# Patient Record
Sex: MALE | Race: WHITE | NOT HISPANIC OR LATINO | URBAN - METROPOLITAN AREA
[De-identification: names, ages, dates, MRNs, and addresses within clinical notes are randomized per-mention and may not be internally consistent; named-entity substitution may affect disease eponyms.]

---

## 2017-06-11 ENCOUNTER — INPATIENT (INPATIENT)
Facility: HOSPITAL | Age: 52
LOS: 0 days | Discharge: HOME | DRG: 392 | End: 2017-06-12
Attending: INTERNAL MEDICINE | Admitting: INTERNAL MEDICINE
Payer: COMMERCIAL

## 2017-06-11 VITALS
OXYGEN SATURATION: 97 % | DIASTOLIC BLOOD PRESSURE: 83 MMHG | TEMPERATURE: 98 F | SYSTOLIC BLOOD PRESSURE: 143 MMHG | HEART RATE: 78 BPM | RESPIRATION RATE: 18 BRPM

## 2017-06-11 DIAGNOSIS — R07.9 CHEST PAIN, UNSPECIFIED: ICD-10-CM

## 2017-06-11 DIAGNOSIS — I10 ESSENTIAL (PRIMARY) HYPERTENSION: ICD-10-CM

## 2017-06-11 DIAGNOSIS — R11.2 NAUSEA WITH VOMITING, UNSPECIFIED: ICD-10-CM

## 2017-06-11 DIAGNOSIS — E66.9 OBESITY, UNSPECIFIED: ICD-10-CM

## 2017-06-11 LAB
ALBUMIN SERPL ELPH-MCNC: 4.4 G/DL — SIGNIFICANT CHANGE UP (ref 3.3–5)
ALP SERPL-CCNC: 68 U/L — SIGNIFICANT CHANGE UP (ref 40–120)
ALT FLD-CCNC: 32 U/L RC — SIGNIFICANT CHANGE UP (ref 10–45)
ANION GAP SERPL CALC-SCNC: 13 MMOL/L — SIGNIFICANT CHANGE UP (ref 5–17)
ANISOCYTOSIS BLD QL: SLIGHT — SIGNIFICANT CHANGE UP
AST SERPL-CCNC: 19 U/L — SIGNIFICANT CHANGE UP (ref 10–40)
BASOPHILS # BLD AUTO: 0.1 K/UL — SIGNIFICANT CHANGE UP (ref 0–0.2)
BILIRUB SERPL-MCNC: 0.4 MG/DL — SIGNIFICANT CHANGE UP (ref 0.2–1.2)
BUN SERPL-MCNC: 16 MG/DL — SIGNIFICANT CHANGE UP (ref 7–23)
CALCIUM SERPL-MCNC: 9.1 MG/DL — SIGNIFICANT CHANGE UP (ref 8.4–10.5)
CHLORIDE SERPL-SCNC: 108 MMOL/L — SIGNIFICANT CHANGE UP (ref 96–108)
CK MB BLD-MCNC: 2.9 % — SIGNIFICANT CHANGE UP (ref 0–3.5)
CK MB BLD-MCNC: 3 % — SIGNIFICANT CHANGE UP (ref 0–3.5)
CK MB CFR SERPL CALC: 2.7 NG/ML — SIGNIFICANT CHANGE UP (ref 0–6.7)
CK MB CFR SERPL CALC: 3 NG/ML — SIGNIFICANT CHANGE UP (ref 0–6.7)
CK SERPL-CCNC: 105 U/L — SIGNIFICANT CHANGE UP (ref 30–200)
CK SERPL-CCNC: 91 U/L — SIGNIFICANT CHANGE UP (ref 30–200)
CO2 SERPL-SCNC: 22 MMOL/L — SIGNIFICANT CHANGE UP (ref 22–31)
CREAT SERPL-MCNC: 1.09 MG/DL — SIGNIFICANT CHANGE UP (ref 0.5–1.3)
DACRYOCYTES BLD QL SMEAR: SLIGHT — SIGNIFICANT CHANGE UP
EOSINOPHIL # BLD AUTO: 0.2 K/UL — SIGNIFICANT CHANGE UP (ref 0–0.5)
EOSINOPHIL NFR BLD AUTO: 2 % — SIGNIFICANT CHANGE UP (ref 0–6)
GLUCOSE SERPL-MCNC: 107 MG/DL — HIGH (ref 70–99)
HCT VFR BLD CALC: 42.5 % — SIGNIFICANT CHANGE UP (ref 39–50)
HGB BLD-MCNC: 13.3 G/DL — SIGNIFICANT CHANGE UP (ref 13–17)
LYMPHOCYTES # BLD AUTO: 2.7 K/UL — SIGNIFICANT CHANGE UP (ref 1–3.3)
LYMPHOCYTES # BLD AUTO: 23 % — SIGNIFICANT CHANGE UP (ref 13–44)
MCHC RBC-ENTMCNC: 22.5 PG — LOW (ref 27–34)
MCHC RBC-ENTMCNC: 31.3 GM/DL — LOW (ref 32–36)
MCV RBC AUTO: 72 FL — LOW (ref 80–100)
MICROCYTES BLD QL: SIGNIFICANT CHANGE UP
MONOCYTES # BLD AUTO: 1.1 K/UL — HIGH (ref 0–0.9)
MONOCYTES NFR BLD AUTO: 8 % — SIGNIFICANT CHANGE UP (ref 2–14)
NEUTROPHILS # BLD AUTO: 6 K/UL — SIGNIFICANT CHANGE UP (ref 1.8–7.4)
NEUTROPHILS NFR BLD AUTO: 59 % — SIGNIFICANT CHANGE UP (ref 43–77)
NT-PROBNP SERPL-SCNC: 55 PG/ML — SIGNIFICANT CHANGE UP (ref 0–300)
PLAT MORPH BLD: NORMAL — SIGNIFICANT CHANGE UP
PLATELET # BLD AUTO: 265 K/UL — SIGNIFICANT CHANGE UP (ref 150–400)
POIKILOCYTOSIS BLD QL AUTO: SLIGHT — SIGNIFICANT CHANGE UP
POTASSIUM SERPL-MCNC: 3.8 MMOL/L — SIGNIFICANT CHANGE UP (ref 3.5–5.3)
POTASSIUM SERPL-SCNC: 3.8 MMOL/L — SIGNIFICANT CHANGE UP (ref 3.5–5.3)
PROT SERPL-MCNC: 7.4 G/DL — SIGNIFICANT CHANGE UP (ref 6–8.3)
RBC # BLD: 5.91 M/UL — HIGH (ref 4.2–5.8)
RBC # FLD: 13.8 % — SIGNIFICANT CHANGE UP (ref 10.3–14.5)
RBC BLD AUTO: ABNORMAL
SODIUM SERPL-SCNC: 143 MMOL/L — SIGNIFICANT CHANGE UP (ref 135–145)
TROPONIN T SERPL-MCNC: <0.01 NG/ML — SIGNIFICANT CHANGE UP (ref 0–0.06)
TROPONIN T SERPL-MCNC: <0.01 NG/ML — SIGNIFICANT CHANGE UP (ref 0–0.06)
VARIANT LYMPHS # BLD: 8 % — HIGH (ref 0–6)
WBC # BLD: 10 K/UL — SIGNIFICANT CHANGE UP (ref 3.8–10.5)
WBC # FLD AUTO: 10 K/UL — SIGNIFICANT CHANGE UP (ref 3.8–10.5)

## 2017-06-11 PROCEDURE — 99285 EMERGENCY DEPT VISIT HI MDM: CPT | Mod: 25

## 2017-06-11 PROCEDURE — 76700 US EXAM ABDOM COMPLETE: CPT | Mod: 26

## 2017-06-11 PROCEDURE — 71010: CPT | Mod: 26

## 2017-06-11 PROCEDURE — 93010 ELECTROCARDIOGRAM REPORT: CPT

## 2017-06-11 RX ORDER — PANTOPRAZOLE SODIUM 20 MG/1
40 TABLET, DELAYED RELEASE ORAL
Qty: 0 | Refills: 0 | Status: DISCONTINUED | OUTPATIENT
Start: 2017-06-11 | End: 2017-06-12

## 2017-06-11 RX ORDER — HEPARIN SODIUM 5000 [USP'U]/ML
5000 INJECTION INTRAVENOUS; SUBCUTANEOUS EVERY 12 HOURS
Qty: 0 | Refills: 0 | Status: DISCONTINUED | OUTPATIENT
Start: 2017-06-11 | End: 2017-06-12

## 2017-06-11 RX ORDER — ASPIRIN/CALCIUM CARB/MAGNESIUM 324 MG
162 TABLET ORAL DAILY
Qty: 0 | Refills: 0 | Status: DISCONTINUED | OUTPATIENT
Start: 2017-06-11 | End: 2017-06-12

## 2017-06-11 RX ORDER — FAMOTIDINE 10 MG/ML
20 INJECTION INTRAVENOUS ONCE
Qty: 0 | Refills: 0 | Status: COMPLETED | OUTPATIENT
Start: 2017-06-11 | End: 2017-06-11

## 2017-06-11 RX ORDER — LOSARTAN POTASSIUM 100 MG/1
1 TABLET, FILM COATED ORAL
Qty: 0 | Refills: 0 | COMMUNITY

## 2017-06-11 RX ORDER — LOSARTAN POTASSIUM 100 MG/1
100 TABLET, FILM COATED ORAL DAILY
Qty: 0 | Refills: 0 | Status: DISCONTINUED | OUTPATIENT
Start: 2017-06-11 | End: 2017-06-12

## 2017-06-11 RX ORDER — METOPROLOL TARTRATE 50 MG
25 TABLET ORAL
Qty: 0 | Refills: 0 | Status: DISCONTINUED | OUTPATIENT
Start: 2017-06-11 | End: 2017-06-12

## 2017-06-11 RX ADMIN — Medication 162 MILLIGRAM(S): at 17:17

## 2017-06-11 RX ADMIN — FAMOTIDINE 20 MILLIGRAM(S): 10 INJECTION INTRAVENOUS at 18:45

## 2017-06-11 NOTE — ED ADULT NURSE REASSESSMENT NOTE - NS ED NURSE REASSESS COMMENT FT1
pt resting comfortably on continuous portable tele monitor. wife at bedside. denies current cp, sob, numbness, tingling, weakness or dizziness. pt tba, pending bed assignment. safety precautions maintained.

## 2017-06-11 NOTE — ED PROVIDER NOTE - OBJECTIVE STATEMENT
51 y/o male w/ htn with 3 days of epgiastric burning pain radiating to chest, associated wityh 1 episode vomiting. first episode lasted 1 hour 3 days ago, second episode for may hours last night.  Both episodes after exerting himself more than normal. currently pain free.  +FH, nonsmoker, +obesity.

## 2017-06-11 NOTE — H&P ADULT - NSHPLABSRESULTS_GEN_ALL_CORE
13.3   10.0  )-----------( 265      ( 11 Jun 2017 17:19 )             42.5       06-11    143  |  108  |  16  ----------------------------<  107<H>  3.8   |  22  |  1.09    Ca    9.1      11 Jun 2017 17:19    TPro  7.4  /  Alb  4.4  /  TBili  0.4  /  DBili  x   /  AST  19  /  ALT  32  /  AlkPhos  68  06-11                      Lactate Trend      CARDIAC MARKERS ( 11 Jun 2017 17:19 )  x     / <0.01 ng/mL / 105 U/L / x     / 3.0 ng/mL        CAPILLARY BLOOD GLUCOSE 13.3   10.0  )-----------( 265      ( 11 Jun 2017 17:19 )             42.5       06-11    143  |  108  |  16  ----------------------------<  107<H>  3.8   |  22  |  1.09    Ca    9.1      11 Jun 2017 17:19    TPro  7.4  /  Alb  4.4  /  TBili  0.4  /  DBili  x   /  AST  19  /  ALT  32  /  AlkPhos  68  06-11        EKG SR NSST/T              Lactate Trend      CARDIAC MARKERS ( 11 Jun 2017 17:19 )  x     / <0.01 ng/mL / 105 U/L / x     / 3.0 ng/mL        CAPILLARY BLOOD GLUCOSE

## 2017-06-11 NOTE — ED ADULT NURSE NOTE - OBJECTIVE STATEMENT
51yo m a&ox4 ambulated into ED c/o chest pain mid sternal since friday intermittent. pt states she took nexium with no relief, and yesterday also had vomiting with chest pain. pt was sent in by pmd for cath. pt denies n/v currently, no diaphoresis, denies h/o chest pain prior to this week. pt denies sob, lungs clear b/l, skin w/d/i, abd soft nontender nondistended.

## 2017-06-11 NOTE — H&P ADULT - NSHPREVIEWOFSYSTEMS_GEN_ALL_CORE
REVIEW OF SYSTEMS:    CONSTITUTIONAL: No weakness, fevers or chills  EYES/ENT: No visual changes;  No vertigo or throat pain   NECK: No pain or stiffness  RESPIRATORY: No cough, wheezing, hemoptysis; No shortness of breath  CARDIOVASCULAR: chest pain no palpitations  GASTROINTESTINAL:  epigastric pain, nausea, vomiting. No hematemesis; No diarrhea or constipation. No melena or hematochezia.  GENITOURINARY: No dysuria, frequency or hematuria  NEUROLOGICAL: No numbness or weakness  SKIN: No itching, burning, rashes, or lesions   All other review of systems is negative unless indicated above.

## 2017-06-11 NOTE — ED PROVIDER NOTE - ATTENDING CONTRIBUTION TO CARE
MD Fox:  patient seen and evaluated with the resident.  I was present for key portions of the History & Physical, and I agree with the Impression & Plan.  MD Fox:  53 yo M c/o epigastric pain for the last 2d.  Onset Friday 4am, worse Sat night after fatty meal with associated diaphoresis, but no SOB.  +FHx CAD, +obesity.  Told to go to ED by primary MD, Dr. TEREZA Barros.  NO exercise intolerance.  VS - wnl.  Physical Exam: adult M, NAD, NCAT, PERRL, EOMI, neck supple, CTA B, RRR, Abd:  s/nd + RUQ/epigastric TTP.  no peripheral edema.  ECG - wnl.  impression:  ACS vs biliary colic.  Plan:  acs w/u with madeleine workup in parallel with RUQ US. MD Fox:  patient seen and evaluated with the resident.  I was present for key portions of the History & Physical, and I agree with the Impression & Plan.  MD Fox:  53 yo M c/o epigastric pain for the last 2d.  Onset Friday 4am, worse Sat night after fatty meal with associated diaphoresis, but no SOB.  +FHx CAD, +obesity.  Told to go to ED by primary MD, Dr. TEREZA Barros.  Reports symptoms did get worse yesterday with exercise.  VS - wnl.  Physical Exam: adult M, NAD, NCAT, PERRL, EOMI, neck supple, CTA B, RRR, Abd:  s/nd + RUQ/epigastric TTP.  no peripheral edema.  ECG - wnl.  impression:  ACS vs biliary colic.  Plan:  acs w/u with madeleine workup in parallel with RUQ US.  Likely admit for cath tomorrow.

## 2017-06-11 NOTE — ED PROVIDER NOTE - PROGRESS NOTE DETAILS
Carlos Rodas: [phone]. Carlos Rodas: [phone].  Spoke with dr neves, would not like heparin at this time

## 2017-06-11 NOTE — ED PROVIDER NOTE - MEDICAL DECISION MAKING DETAILS
MD Fox:  51 yo M c/o epigastric pain for the last 2d.  Onset Friday 4am, worse Sat night after fatty meal with associated diaphoresis, but no SOB.  +FHx CAD, +obesity.  Told to go to ED by primary MD, Dr. TEREZA Barros.  NO exercise intolerance.  VS - wnl.  Physical Exam: adult M, NAD, NCAT, PERRL, EOMI, neck supple, CTA B, RRR, Abd:  s/nd + RUQ/epigastric TTP.  no peripheral edema.  ECG - wnl.  impression:  ACS vs biliary colic.  Plan:  acs w/u with madeleine workup in parallel with RUQ US.

## 2017-06-11 NOTE — H&P ADULT - HISTORY OF PRESENT ILLNESS
53 y/o male w/ htn with 3 days of epgiastric burning pain radiating to chest, associated wityh 1 episode vomiting. first episode lasted 1 hour 3 days ago, second episode for may hours last night.  Both episodes after exerting himself more than normal. currently pain free.  +FH, nonsmoker, +obesity. 53 y/o male w/ htn with 3 days of epigastric burning pain radiating to chest, associated wityh 1 episode vomiting. first episode lasted 1 hour 3 days ago, second episode for may hours last night.  Both episodes after exerting himself more than normal. currently pain free.  +FH, nonsmoker, +obesity.

## 2017-06-12 VITALS
HEART RATE: 68 BPM | RESPIRATION RATE: 16 BRPM | DIASTOLIC BLOOD PRESSURE: 97 MMHG | OXYGEN SATURATION: 99 % | SYSTOLIC BLOOD PRESSURE: 139 MMHG

## 2017-06-12 DIAGNOSIS — Z82.49 FAMILY HISTORY OF ISCHEMIC HEART DISEASE AND OTHER DISEASES OF THE CIRCULATORY SYSTEM: ICD-10-CM

## 2017-06-12 DIAGNOSIS — I10 ESSENTIAL (PRIMARY) HYPERTENSION: ICD-10-CM

## 2017-06-12 DIAGNOSIS — R07.9 CHEST PAIN, UNSPECIFIED: ICD-10-CM

## 2017-06-12 LAB
AMYLASE P1 CFR SERPL: 38 U/L — SIGNIFICANT CHANGE UP (ref 25–125)
ANION GAP SERPL CALC-SCNC: 12 MMOL/L — SIGNIFICANT CHANGE UP (ref 5–17)
BUN SERPL-MCNC: 14 MG/DL — SIGNIFICANT CHANGE UP (ref 7–23)
CALCIUM SERPL-MCNC: 8.9 MG/DL — SIGNIFICANT CHANGE UP (ref 8.4–10.5)
CHLORIDE SERPL-SCNC: 107 MMOL/L — SIGNIFICANT CHANGE UP (ref 96–108)
CO2 SERPL-SCNC: 22 MMOL/L — SIGNIFICANT CHANGE UP (ref 22–31)
CREAT SERPL-MCNC: 1.05 MG/DL — SIGNIFICANT CHANGE UP (ref 0.5–1.3)
GLUCOSE SERPL-MCNC: 108 MG/DL — HIGH (ref 70–99)
HCT VFR BLD CALC: 40.8 % — SIGNIFICANT CHANGE UP (ref 39–50)
HGB BLD-MCNC: 13.1 G/DL — SIGNIFICANT CHANGE UP (ref 13–17)
LIDOCAIN IGE QN: 25 U/L — SIGNIFICANT CHANGE UP (ref 7–60)
MCHC RBC-ENTMCNC: 22.7 PG — LOW (ref 27–34)
MCHC RBC-ENTMCNC: 32.1 GM/DL — SIGNIFICANT CHANGE UP (ref 32–36)
MCV RBC AUTO: 70.6 FL — LOW (ref 80–100)
PLATELET # BLD AUTO: 251 K/UL — SIGNIFICANT CHANGE UP (ref 150–400)
POTASSIUM SERPL-MCNC: 4.1 MMOL/L — SIGNIFICANT CHANGE UP (ref 3.5–5.3)
POTASSIUM SERPL-SCNC: 4.1 MMOL/L — SIGNIFICANT CHANGE UP (ref 3.5–5.3)
RBC # BLD: 5.78 M/UL — SIGNIFICANT CHANGE UP (ref 4.2–5.8)
RBC # FLD: 15.7 % — HIGH (ref 10.3–14.5)
SODIUM SERPL-SCNC: 141 MMOL/L — SIGNIFICANT CHANGE UP (ref 135–145)
TROPONIN T SERPL-MCNC: <0.01 NG/ML — SIGNIFICANT CHANGE UP (ref 0–0.06)
WBC # BLD: 7.45 K/UL — SIGNIFICANT CHANGE UP (ref 3.8–10.5)
WBC # FLD AUTO: 7.45 K/UL — SIGNIFICANT CHANGE UP (ref 3.8–10.5)

## 2017-06-12 PROCEDURE — 85027 COMPLETE CBC AUTOMATED: CPT

## 2017-06-12 PROCEDURE — 83880 ASSAY OF NATRIURETIC PEPTIDE: CPT

## 2017-06-12 PROCEDURE — C1894: CPT

## 2017-06-12 PROCEDURE — C1887: CPT

## 2017-06-12 PROCEDURE — 93458 L HRT ARTERY/VENTRICLE ANGIO: CPT

## 2017-06-12 PROCEDURE — 71045 X-RAY EXAM CHEST 1 VIEW: CPT

## 2017-06-12 PROCEDURE — 76700 US EXAM ABDOM COMPLETE: CPT

## 2017-06-12 PROCEDURE — C1769: CPT

## 2017-06-12 PROCEDURE — 82150 ASSAY OF AMYLASE: CPT

## 2017-06-12 PROCEDURE — 80048 BASIC METABOLIC PNL TOTAL CA: CPT

## 2017-06-12 PROCEDURE — 96374 THER/PROPH/DIAG INJ IV PUSH: CPT

## 2017-06-12 PROCEDURE — 82550 ASSAY OF CK (CPK): CPT

## 2017-06-12 PROCEDURE — 82553 CREATINE MB FRACTION: CPT

## 2017-06-12 PROCEDURE — 84484 ASSAY OF TROPONIN QUANT: CPT

## 2017-06-12 PROCEDURE — 99285 EMERGENCY DEPT VISIT HI MDM: CPT | Mod: 25

## 2017-06-12 PROCEDURE — 93005 ELECTROCARDIOGRAM TRACING: CPT

## 2017-06-12 PROCEDURE — 83690 ASSAY OF LIPASE: CPT

## 2017-06-12 PROCEDURE — 80053 COMPREHEN METABOLIC PANEL: CPT

## 2017-06-12 RX ORDER — PANTOPRAZOLE SODIUM 20 MG/1
1 TABLET, DELAYED RELEASE ORAL
Qty: 30 | Refills: 1 | OUTPATIENT
Start: 2017-06-12 | End: 2017-08-10

## 2017-06-12 RX ADMIN — Medication 162 MILLIGRAM(S): at 10:37

## 2017-06-12 RX ADMIN — LOSARTAN POTASSIUM 100 MILLIGRAM(S): 100 TABLET, FILM COATED ORAL at 06:10

## 2017-06-12 RX ADMIN — PANTOPRAZOLE SODIUM 40 MILLIGRAM(S): 20 TABLET, DELAYED RELEASE ORAL at 06:10

## 2017-06-12 NOTE — PROVIDER CONTACT NOTE (OTHER) - ACTION/TREATMENT ORDERED:
as per NP Solis bernstein, will discuss with cardiology the reason for lopressor, document patient's refusal at this time
Educated on the risks and benefits of DVT prophylaxis, pt continues to refuse, as per NP Solis, document patient's refusal at this time

## 2017-06-12 NOTE — CONSULT NOTE ADULT - ASSESSMENT
52 year old male with 3 day history of substernal burning pain with associated nausea, slight relief with TUMS.  Now s/p diagnostic cath - await official report (pending)    Suspect dyspepsia/ GERD  trial of Protonix  can use Maalox/TUMS prn  Tolerating PO diet  Outpatient follow up with Dr Chavez - may need EGD as outpatient if symptoms still persist after 2-3 weeks [phone]    No GI objection to discharge planning    Thank you for the courtesy of this consult.    Shaheen Nick PA-C    [facility]  [phone] 52 year old male with 3 day history of substernal burning pain with associated nausea, slight relief with TUMS.  Now s/p diagnostic cath - await official report (pending). US negative for acute pathology except for fatty liver.    Suspect dyspepsia/ GERD  trial of Protonix  can use Maalox/TUMS prn  Tolerating PO diet  Outpatient follow up with Dr Chavez - may need EGD as outpatient if symptoms still persist after 2-3 weeks [phone]    No GI objection to discharge planning    Thank you for the courtesy of this consult.    Shaheen Nick PA-C    [facility]  [phone]

## 2017-06-12 NOTE — CONSULT NOTE ADULT - SUBJECTIVE AND OBJECTIVE BOX
Patient is a 52y old  Male who presents with a chief complaint of Chest pain on exertion (12 Jun 2017 11:31)      HPI:  53 y/o male w/ htn with 3 days of epigastric burning pain radiating to chest, associated with 1 episode vomiting . First episode lasted 1 hour Thursday, second episode for many hours Saturday night.  Both episodes after exerting himself more than normal. Currently pain free.  +FH, nonsmoker, +obesity. (11 Jun 2017 21:08)    No further chest pain, but +dyspepsia - improved with PO Tums  No previous EGD   no BRBRPR, diarrhea, melena, abdominal pain, hemetemesis.  Appetite good and tolerating PO diet  s/p diagnostic cath this morning - no intervention. He is eager to go home today.      PAST MEDICAL & SURGICAL HISTORY:  Hypertension  s/p lap appy  colon polyps (benign)      Allergies    No Known Allergies        MEDICATIONS  (STANDING):  aspirin  chewable 162milliGRAM(s) Oral daily  heparin  Injectable 5000Unit(s) SubCutaneous every 12 hours  pantoprazole    Tablet 40milliGRAM(s) Oral before breakfast  metoprolol 25milliGRAM(s) Oral two times a day  losartan 100milliGRAM(s) Oral daily    MEDICATIONS  (PRN):      Social History:    Marital Status:  (   )    (   ) Single    (   )    (  )   Occupation:   Lives with: (  ) alone  (  ) children   (  ) spouse   (  ) parents  (  ) other    Substance Use (street drugs): (  ) never used  (  ) other:  Tobacco Usage:  (   ) never smoked   (   ) former smoker   (   ) current smoker  (     ) pack year  (        ) last cigarette date  Alcohol Usage:  Sexual History:     Family History   IBD (  ) Yes   (  x) No  GI Malignancy ( x )  Yes - mother colon ca dx age 70's   (  ) No    Health Management     Last Colonoscopy - last colo approx. 2 years ago, small benign polyps     Advanced Directives: (  x   ) None    (      ) DNR    (     ) DNI    (     ) Health Care Proxy:     Review of Systems:    General:  No wt loss, fevers, chills, night sweats, fatigue  CV:  +substernal pain - resolved. No palpitations, hypo/hypertension  Resp:  No dyspnea, cough, tachypnea, wheezing  GI:  see HPI  :  No pain, bleeding, incontinence, nocturia  Muscle:  No pain, weakness  Neuro:  No focal weakness, tingling, memory problems  Psych:  No fatigue, insomnia, mood problems, depression  Endocrine:  No polyuria, polydypsia, cold/heat intolerance  Heme:  No petechiae, ecchymosis, easy bruisability  Skin:  No rash, tattoos, scars, edema      Vital Signs Last 24 Hrs  T(C): 36.4, Max: 36.8 (06-11 @ 16:29)  T(F): 97.5, Max: 98.3 (06-11 @ 16:29)  HR: 72 (60 - 78)  BP: 137/96 (132/89 - 162/98)  BP(mean): --  RR: 17 (16 - 19)  SpO2: 98% (96% - 98%)    PHYSICAL EXAM:    Constitutional: NAD, well-developed, non-toxic. completed 100% of breakfast tray  Neck: No LAD, supple  Respiratory: clear anteriorly, no rales, rhonchi, wheezes. Non-tender to palpation  Cardiovascular: S1 and S2, RRR  Gastrointestinal: BS+, soft, NT/ND, neg HSM  Extremities: No peripheral edema, neg clubbing, cyanosis  Vascular: 2+ peripheral pulses  Neurological: A/O x 3, no focal deficits  Psychiatric: Normal mood, normal affect  Skin: No rashes        LABS:                        13.1   7.45  )-----------( 251      ( 12 Jun 2017 07:19 )             40.8     06-12    141  |  107  |  14  ----------------------------<  108<H>  4.1   |  22  |  1.05    Ca    8.9      12 Jun 2017 06:11    TPro  7.4  /  Alb  4.4  /  TBili  0.4  /  DBili  x   /  AST  19  /  ALT  32  /  AlkPhos  68  06-11        LIVER FUNCTIONS - ( 11 Jun 2017 17:19 )  Alb: 4.4 g/dL / Pro: 7.4 g/dL / ALK PHOS: 68 U/L / ALT: 32 U/L RC / AST: 19 U/L / GGT: x             RADIOLOGY & ADDITIONAL TESTS: Patient is a 52y old  Male who presents with a chief complaint of Chest pain on exertion (12 Jun 2017 11:31)      HPI:  51 y/o male w/ htn with 3 days of epigastric burning pain radiating to chest, associated with 1 episode vomiting . First episode lasted 1 hour Thursday, second episode for many hours Saturday night.  Both episodes after exerting himself more than normal. Currently pain free.  +FH, nonsmoker, +obesity. (11 Jun 2017 21:08)    No further chest pain, but +dyspepsia - improved with PO Tums  No previous EGD   no BRBRPR, diarrhea, melena, abdominal pain, hemetemesis.  Appetite good and tolerating PO diet  s/p diagnostic cath this morning - no intervention. He is eager to go home today.      PAST MEDICAL & SURGICAL HISTORY:  Hypertension  s/p lap appy  colon polyps (benign)      Allergies    No Known Allergies        MEDICATIONS  (STANDING):  aspirin  chewable 162milliGRAM(s) Oral daily  heparin  Injectable 5000Unit(s) SubCutaneous every 12 hours  pantoprazole    Tablet 40milliGRAM(s) Oral before breakfast  metoprolol 25milliGRAM(s) Oral two times a day  losartan 100milliGRAM(s) Oral daily    MEDICATIONS  (PRN):      Social History:    Marital Status:  (   )    (   ) Single    (   )    (  )   Occupation:   Lives with: (  ) alone  (  ) children   (  ) spouse   (  ) parents  (  ) other    Substance Use (street drugs): (  ) never used  (  ) other:  Tobacco Usage:  (   ) never smoked   (   ) former smoker   (   ) current smoker  (     ) pack year  (        ) last cigarette date  Alcohol Usage:  Sexual History:     Family History   IBD (  ) Yes   (  x) No  GI Malignancy ( x )  Yes - mother colon ca dx age 70's   (  ) No    Health Management     Last Colonoscopy - last colo approx. 2 years ago, small benign polyps     Advanced Directives: (  x   ) None    (      ) DNR    (     ) DNI    (     ) Health Care Proxy:     Review of Systems:    General:  No wt loss, fevers, chills, night sweats, fatigue  CV:  +substernal pain - resolved. No palpitations, hypo/hypertension  Resp:  No dyspnea, cough, tachypnea, wheezing  GI:  see HPI  :  No pain, bleeding, incontinence, nocturia  Muscle:  No pain, weakness  Neuro:  No focal weakness, tingling, memory problems  Psych:  No fatigue, insomnia, mood problems, depression  Endocrine:  No polyuria, polydypsia, cold/heat intolerance  Heme:  No petechiae, ecchymosis, easy bruisability  Skin:  No rash, tattoos, scars, edema      Vital Signs Last 24 Hrs  T(C): 36.4, Max: 36.8 (06-11 @ 16:29)  T(F): 97.5, Max: 98.3 (06-11 @ 16:29)  HR: 72 (60 - 78)  BP: 137/96 (132/89 - 162/98)  BP(mean): --  RR: 17 (16 - 19)  SpO2: 98% (96% - 98%)    PHYSICAL EXAM:    Constitutional: NAD, well-developed, non-toxic. completed 100% of breakfast tray  Neck: No LAD, supple  Respiratory: clear anteriorly, no rales, rhonchi, wheezes. Non-tender to palpation  Cardiovascular: S1 and S2, RRR  Gastrointestinal: BS+, soft, NT/ND, neg HSM  Extremities: No peripheral edema, neg clubbing, cyanosis  Vascular: 2+ peripheral pulses  Neurological: A/O x 3, no focal deficits  Psychiatric: Normal mood, normal affect  Skin: No rashes        LABS:                        13.1   7.45  )-----------( 251      ( 12 Jun 2017 07:19 )             40.8     06-12    141  |  107  |  14  ----------------------------<  108<H>  4.1   |  22  |  1.05    Ca    8.9      12 Jun 2017 06:11    TPro  7.4  /  Alb  4.4  /  TBili  0.4  /  DBili  x   /  AST  19  /  ALT  32  /  AlkPhos  68  06-11        LIVER FUNCTIONS - ( 11 Jun 2017 17:19 )  Alb: 4.4 g/dL / Pro: 7.4 g/dL / ALK PHOS: 68 U/L / ALT: 32 U/L RC / AST: 19 U/L / GGT: x             RADIOLOGY & ADDITIONAL TESTS:ECHNIQUE: Sonography of the abdomen.     FINDINGS:    Liver: Measures 19.4 cm. Increased liver echogenicity likely reflects   steatosis.    Bile ducts: Normal caliber. Common bile duct measures 4 mm.     Gallbladder: No evidence of gallbladder wall thickening, gallstones, or   pericholecystic fluid. Negative sonographic Zapata sign.    Pancreas: Visualized portions are within normal limits.    Spleen: Measures 12.5 cm. Within normal limits.    Right kidney: Measures 11.2 cm. No hydronephrosis.    Left kidney: Measures 10.9 cm. No hydronephrosis.     Ascites: None.    Aorta and IVC: Visualized portions are within normal limits.    IMPRESSION:     No evidence of acute cholecystitis.  Hepatic steatosis.

## 2017-06-12 NOTE — DISCHARGE NOTE ADULT - HOSPITAL COURSE
53 y/o male w/ htn with 3 days of epigastric burning pain radiating to chest, associated with 1 episode vomiting, first episode lasted 1 hour 3 days ago, second episode for may hours last night. Both episodes after exerting himself more than normal, currently pain free.  +FH, nonsmoker, +obesity. S/p Cath: normal coronaries, VSS, right radial site benign. GI consult advised out pt f/u with Novogrudsky for EGD. Pt disagreed home with Protonix. 53 y/o male w/ htn with 3 days of epigastric burning pain radiating to chest, associated with 1 episode vomiting, first episode lasted 1 hour 3 days ago, second episode for may hours last night. Both episodes after exerting himself more than normal, currently pain free.  +FH, nonsmoker, +obesity. S/p Cath: normal coronaries, VSS, right radial site benign. GI consult advised out pt f/u with Novogrudsky for EGD. Pt discharged home with Protonix.

## 2017-06-12 NOTE — PROGRESS NOTE ADULT - PROBLEM SELECTOR PLAN 1
cardiac monitoring  cardiac enzymes  cardiology evaluation Dr. Marie noted  Cath with normal coronaries.

## 2017-06-12 NOTE — CONSULT NOTE ADULT - ATTENDING COMMENTS
Agree with above.  Pt with likely atypical chest pain.  ?GERD.  Need to r/o PUD.  No GI objection to d/c with outpt evaluation.  Contact information given to patient.

## 2017-06-12 NOTE — PROVIDER CONTACT NOTE (OTHER) - SITUATION
Heparin Sub Q ordered for DVT prophylaxis, pt refusing states that he walks and does not need heparin

## 2017-06-12 NOTE — DISCHARGE NOTE ADULT - MEDICATION SUMMARY - MEDICATIONS TO TAKE
I will START or STAY ON the medications listed below when I get home from the hospital:    losartan 100 mg oral tablet  -- 1 tab(s) by mouth once a day  -- Indication: For Chest pain    pantoprazole 40 mg oral delayed release tablet  -- 1 tab(s) by mouth once a day (before a meal)  -- Indication: For acid reflux I will START or STAY ON the medications listed below when I get home from the hospital:    losartan 100 mg oral tablet  -- 1 tab(s) by mouth once a day  -- Indication: For Chest pain    pantoprazole 40 mg oral delayed release tablet  -- 1 tab(s) by mouth once a day (before a meal)  -- Indication: For acide reflux

## 2017-06-12 NOTE — DISCHARGE NOTE ADULT - CARE PROVIDERS DIRECT ADDRESSES
,DirectAddress_Unknown,[email or web address],DirectAddress_Unknown

## 2017-06-12 NOTE — PROGRESS NOTE ADULT - PROBLEM SELECTOR PLAN 3
nausea control  gastroenterology evaluation noted  probable gastritis   follow up with GI as otp for EGD

## 2017-06-12 NOTE — DISCHARGE NOTE ADULT - PLAN OF CARE
free of chest pain Low salt, low fat diet.   Weight management.   Take medications as prescribed.    No smoking.  Follow up appointments with your doctor(s)  as instructed. Your blood pressure will be controlled. Continue with your blood pressure medications; eat a heart healthy diet with low salt diet; exercise regularly (consult with your physician or cardiologist first); maintain a heart healthy weight; if you smoke - quit (A resource to help you stop smoking is the [facility] – phone number [phone].); include healthy ways to manage stress. Continue to follow with your primary care physician or cardiologist.

## 2017-06-12 NOTE — CONSULT NOTE ADULT - SUBJECTIVE AND OBJECTIVE BOX
CHIEF COMPLAINT:chest burning     HISTORY OF PRESENT ILLNESS:  HPI:  51 y/o male w/ htn with 3 days of epigastric burning pain radiating to chest, associated wityh 1 episode vomiting. first episode lasted 1 hour 3 days ago, second episode for may hours last night.  Both episodes after exerting himself more than normal. currently pain free.  +FH, nonsmoker, +obesity.   negative enzymes, normal LFTs and normal ultrasound of abdomen    PAST MEDICAL & SURGICAL HISTORY:  Hypertension          MEDICATIONS:  aspirin  chewable 162milliGRAM(s) Oral daily  heparin  Injectable 5000Unit(s) SubCutaneous every 12 hours  metoprolol 25milliGRAM(s) Oral two times a day  losartan 100milliGRAM(s) Oral del      pantoprazole    Tablet 40milliGRAM(s) Oral before breakfast          FAMILY HISTORY:      SOCIAL HISTORY:    [ ] Non-smoker  [ ] Smoker  [ ] Alcohol    Allergies    No Known Allergies    Intolerances    	    REVIEW OF SYSTEMS:  CONSTITUTIONAL: No fever, weight loss, or fatigue  EYES: No eye pain, visual disturbances, or discharge  ENMT:  No difficulty hearing, tinnitus, vertigo; No sinus or throat pain  NECK: No pain or stiffness  RESPIRATORY: No cough, wheezing, chills or hemoptysis; No Shortness of Breath  CARDIOVASCULAR: No chest pain, palpitations, passing out, dizziness, or leg swelling  GASTROINTESTINAL: No abdominal or epigastric pain. No nausea, vomiting, or hematemesis; No diarrhea or constipation. No melena or hematochezia.  GENITOURINARY: No dysuria, frequency, hematuria, or incontinence  NEUROLOGICAL: No headaches, memory loss, loss of strength, numbness, or tremors  SKIN: No itching, burning, rashes, or lesions   LYMPH Nodes: No enlarged glands  ENDOCRINE: No heat or cold intolerance; No hair loss  MUSCULOSKELETAL: No joint pain or swelling; No muscle, back, or extremity pain  PSYCHIATRIC: No depression, anxiety, mood swings, or difficulty sleeping  HEME/LYMPH: No easy bruising, or bleeding gums  ALLERY AND IMMUNOLOGIC: No hives or eczema	    PHYSICAL EXAM:  T(C): 36.4, Max: 36.8 (06-11 @ 16:29)  HR: 60 (60 - 78)  BP: 147/97 (132/89 - 162/98)  RR: 17 (17 - 19)  SpO2: 96% (96% - 98%)  Wt(kg): --  I&O's Summary      Appearance: Normal	  HEENT:   Normal oral mucosa, PERRL, EOMI	  Lymphatic: No lymphadenopathy  Cardiovascular: Normal S1 S2, No JVD, No murmurs, No edema  Respiratory: Lungs clear to auscultation	  Psychiatry: A & O x 3, Mood & affect appropriate  Gastrointestinal:  Soft, Non-tender, + BS	  Skin: No rashes, No ecchymoses, No cyanosis	  Neurologic: Non-focal  : Normal range of motion, No clubbing, cyanosis or edema  Vascular: Peripheral pulses palpable 2+ bilaterally    TELEMETRY: 	    ECG:NSR WNL 6/11/17  	  : 	  	  LABS:	 	    CARDIAC MARKERS:  Troponin T, Serum: <0.01 ng/mL (06-12 @ 06:11)  Troponin T, Serum: <0.01 ng/mL (06-11 @ 21:35)  Troponin T, Serum: <0.01 ng/mL (06-11 @ 17:19)      ultrasound abdomen: 6/11/17: allbladder: No evidence of gallbladder wall thickening, gallstones, or   pericholecystic fluid. Negative sonographic Zapata sign.    Pancreas: Visualized portions are within normal limits.    Spleen: Measures 12.5 cm. Within normal limits.    Right kidney: Measures 11.2 cm. No hydronephrosis.    Left kidney: Measures 10.9 cm. No hydronephrosis.     Ascites: None.    Aorta and IVC: Visualized portions are within normal limits.    IMPRESSION:     No evidence of acute cholecystitis.  Hepatic steatosis.                              13.1   7.45  )-----------( 251      ( 12 Jun 2017 07:19 )             40.8     06-12    141  |  107  |  14  ----------------------------<  108<H>  4.1   |  22  |  1.05    Ca    8.9      12 Jun 2017 06:11    TPro  7.4  /  Alb  4.4  /  TBili  0.4  /  DBili  x   /  AST  19  /  ALT  32  /  AlkPhos  68  06-11    proBNP: Serum Pro-Brain Natriuretic Peptide: 55 pg/mL (06-11 @ 17:19)

## 2017-06-12 NOTE — CONSULT NOTE ADULT - ASSESSMENT
Impression:  1. chest burning with risk factors for CAD  2. rule out CAD    Recommend:  1. diagnostic cath  2. if negative discharge home  3. risk benefits and alternatives discussed.

## 2017-06-12 NOTE — DISCHARGE NOTE ADULT - CARE PLAN
Principal Discharge DX:	Chest pain  Goal:	free of chest pain  Instructions for follow-up, activity and diet:	Low salt, low fat diet.   Weight management.   Take medications as prescribed.    No smoking.  Follow up appointments with your doctor(s)  as instructed.  Secondary Diagnosis:	Hypertension  Goal:	Your blood pressure will be controlled.  Instructions for follow-up, activity and diet:	Continue with your blood pressure medications; eat a heart healthy diet with low salt diet; exercise regularly (consult with your physician or cardiologist first); maintain a heart healthy weight; if you smoke - quit (A resource to help you stop smoking is the [facility] – phone number [phone].); include healthy ways to manage stress. Continue to follow with your primary care physician or cardiologist. Principal Discharge DX:	Chest pain  Goal:	free of chest pain  Instructions for follow-up, activity and diet:	Low salt, low fat diet.   Weight management.   Take medications as prescribed.    No smoking.  Follow up appointments with your doctor(s)  as instructed.  Secondary Diagnosis:	Hypertension  Goal:	Your blood pressure will be controlled.  Instructions for follow-up, activity and diet:	Continue with your blood pressure medications; eat a heart healthy diet with low salt diet; exercise regularly (consult with your physician or cardiologist first); maintain a heart healthy weight; if you smoke - quit (A resource to help you stop smoking is the [facility] – phone number [phone].); include healthy ways to manage stress. Continue to follow with your primary care physician or cardiologist. Principal Discharge DX:	Chest pain  Goal:	free of chest pain  Instructions for follow-up, activity and diet:	Low salt, low fat diet.   Weight management.   Take medications as prescribed.    No smoking.  Follow up appointments with your doctor(s)  as instructed.  Secondary Diagnosis:	Hypertension  Goal:	Your blood pressure will be controlled.  Instructions for follow-up, activity and diet:	Continue with your blood pressure medications; eat a heart healthy diet with low salt diet; exercise regularly (consult with your physician or cardiologist first); maintain a heart healthy weight; if you smoke - quit (A resource to help you stop smoking is the [facility] – phone number [phone].); include healthy ways to manage stress. Continue to follow with your primary care physician or cardiologist.

## 2017-06-12 NOTE — PROGRESS NOTE ADULT - SUBJECTIVE AND OBJECTIVE BOX
Patient is a 52y old  Male who presents with a chief complaint of Chest pain on exertion (12 Jun 2017 11:31)      SUBJECTIVE / OVERNIGHT EVENTS: Comfortable  Review of Systems  chest pain no  palpitations no  sob no  nausea no  headache no    MEDICATIONS  (STANDING):  aspirin  chewable 162milliGRAM(s) Oral daily  heparin  Injectable 5000Unit(s) SubCutaneous every 12 hours  pantoprazole    Tablet 40milliGRAM(s) Oral before breakfast  metoprolol 25milliGRAM(s) Oral two times a day  losartan 100milliGRAM(s) Oral daily    MEDICATIONS  (PRN):      Vital Signs Last 24 Hrs  T(C): 36.4, Max: 36.8 (06-11 @ 16:29)  HR: 67 (60 - 78)  BP: 137/89 (132/89 - 162/98)  RR: 17 (16 - 19)  SpO2: 99% (96% - 99%)  Wt(kg): --  CAPILLARY BLOOD GLUCOSE    I&O's Summary    I & Os for current day (as of 12 Jun 2017 15:57)  =============================================  IN: 320 ml / OUT: 0 ml / NET: 320 ml      PHYSICAL EXAM:  GENERAL: NAD, well-developed  HEAD:  Atraumatic, Normocephalic  EYES: EOMI, PERRLA, conjunctiva and sclera clear  NECK: Supple, No JVD  CHEST/LUNG: Clear to auscultation bilaterally; No wheeze  HEART: Regular rate and rhythm; No murmurs, rubs, or gallops  ABDOMEN: Soft, Nontender, Nondistended; Bowel sounds present  EXTREMITIES:  2+ Peripheral Pulses, No clubbing, cyanosis, or edema  PSYCH: AAOx3  NEUROLOGY: non-focal  SKIN: No rashes or lesions    LABS:                        13.1   7.45  )-----------( 251      ( 12 Jun 2017 07:19 )             40.8     06-12    141  |  107  |  14  ----------------------------<  108<H>  4.1   |  22  |  1.05    Ca    8.9      12 Jun 2017 06:11    TPro  7.4  /  Alb  4.4  /  TBili  0.4  /  DBili  x   /  AST  19  /  ALT  32  /  AlkPhos  68  06-11      CARDIAC MARKERS ( 12 Jun 2017 06:11 )  x     / <0.01 ng/mL / x     / x     / x      CARDIAC MARKERS ( 11 Jun 2017 21:35 )  x     / <0.01 ng/mL / 91 U/L / x     / 2.7 ng/mL  CARDIAC MARKERS ( 11 Jun 2017 17:19 )  x     / <0.01 ng/mL / 105 U/L / x     / 3.0 ng/mL          RADIOLOGY & ADDITIONAL TESTS:    Imaging Personally Reviewed:    Consultant(s) Notes Reviewed:      Care Discussed with Consultants/Other Providers:

## 2017-06-12 NOTE — DISCHARGE NOTE ADULT - PATIENT PORTAL LINK FT
“You can access the Follow[facility] Patient Portal, offered by [facility], by registering with the following website: [email or web address]”

## 2017-06-12 NOTE — PROVIDER CONTACT NOTE (OTHER) - SITUATION
Pt ordered for lopressor 25mg for hypertension by Dr. Pedraza, pt states that he does not take this medication and does not feel comfortable taking that medication

## 2017-06-12 NOTE — DISCHARGE NOTE ADULT - CARE PROVIDER_API CALL
Nj Pedraza), Internal Medicine  [address] [place] [address]  Phone: [phone]  Fax: [phone]    Renny Chavez), Gastroenterology; Internal Medicine  [address] [id number]  Phone: [phone]  Fax: [phone]    Carlos Rodas), Cardiovascular Disease; Internal Medicine  [address] [place] [address]  Phone: [phone]  Fax: [phone]

## 2017-12-14 ENCOUNTER — TRANSCRIPTION ENCOUNTER (OUTPATIENT)
Age: 52
End: 2017-12-14

## 2019-12-31 PROBLEM — I10 ESSENTIAL (PRIMARY) HYPERTENSION: Chronic | Status: ACTIVE | Noted: 2017-06-11

## 2020-02-07 ENCOUNTER — APPOINTMENT (OUTPATIENT)
Dept: UROLOGY | Facility: CLINIC | Age: 55
End: 2020-02-07
Payer: COMMERCIAL

## 2020-02-07 PROCEDURE — 99204 OFFICE O/P NEW MOD 45 MIN: CPT

## 2020-02-07 NOTE — PHYSICAL EXAM
[General Appearance - Well Developed] : well developed [General Appearance - Well Nourished] : well nourished [Normal Appearance] : normal appearance [Well Groomed] : well groomed [General Appearance - In No Acute Distress] : no acute distress [Edema] : no peripheral edema [Respiration, Rhythm And Depth] : normal respiratory rhythm and effort [Exaggerated Use Of Accessory Muscles For Inspiration] : no accessory muscle use [Abdomen Soft] : soft [Abdomen Tenderness] : non-tender [Costovertebral Angle Tenderness] : no ~M costovertebral angle tenderness [Urethral Meatus] : meatus normal [Penis Abnormality] : normal circumcised penis [Urinary Bladder Findings] : the bladder was normal on palpation [Scrotum] : the scrotum was normal [Testes Mass (___cm)] : there were no testicular masses [Prostate Size ___ (0-4)] : prostate size [unfilled] (scale: 0-4) [FreeTextEntry1] : firm area left apex [Normal Station and Gait] : the gait and station were normal for the patient's age [] : no rash [No Focal Deficits] : no focal deficits [Oriented To Time, Place, And Person] : oriented to person, place, and time [Affect] : the affect was normal [Mood] : the mood was normal [Not Anxious] : not anxious [No Palpable Adenopathy] : no palpable adenopathy

## 2020-02-07 NOTE — LETTER BODY
[Dear  ___] : Dear  [unfilled], [Consult Letter:] : I had the pleasure of evaluating your patient, [unfilled]. [( Thank you for referring [unfilled] for consultation for _____ )] : Thank you for referring [unfilled] for consultation for [unfilled] [Please see my note below.] : Please see my note below. [Consult Closing:] : Thank you very much for allowing me to participate in the care of this patient.  If you have any questions, please do not hesitate to contact me. [Sincerely,] : Sincerely, [FreeTextEntry1] : Pt is 56 yo male referred for eval regarding psa, prostate exam. No sig urinary c/o; stream is generally good, empties well, nocturia ~ 2/night, no hematuria, no dysuria, no h/o uti or stone.  PSA's brought for review.  Saw Dr. White who had recommended prostate biopsy "because psa was higher".\par \par PMH: htn\par PSH: s/p appy (25 years ago), right knee arthroscopy\par Meds: losartan\par NKDA\par FH: mother with colon cancer\par SH: never a smoker, social etoh, \par \par PSA Hx:\par 3.37- 10/2019\par 3.1- 9/2018\par 3.39- 6/2017\par 3.43- 6/2016\par \par Prostate with concern for firm area left apex, though not fully discrete nodule.\par \par PSA levels have been very stable, and wnl for age related range from 5115-6231.  Mild concern regarding firm area left apex of prostate on exam, however not necessarily definitive 'nodule'.\par \par I had long discussion today with patient about the psa, digital exam, and any imaging findings with respect to risks for prostate cancer.  We discussed the utility of prostate MRI as well as some of the new genetic markers in terms of the potential for improving diagnostic accuracy.\par \par We discussed implications as to voiding symptoms, especially with respect to treatment options chosen, and the risk/benefits of prostate biopsy in terms of procedure, dawit-procedure (sepsis, infection, bleeding, retention), and implications/advantages of establishing the diagnosis.\par \par He would like to proceed with: prostate MRI for optimal diagnostic accuracy\par Will also try saw palmetto for mild BPH symptoms

## 2020-02-07 NOTE — HISTORY OF PRESENT ILLNESS
[FreeTextEntry1] : Pt is 56 yo male referred for eval regarding psa, prostate exam. No sig urinary c/o; stream is generally good, empties well, nocturia ~ 2/night, no hematuria, no dysuria, no h/o uti or stone.  PSA's brought for review.  Saw Dr. White who had recommended prostate biopsy "because psa was higher".\par \par PMH: htn\par PSH: s/p appy (25 years ago), right knee arthroscopy\par Meds: losartan\par NKDA\par FH: mother with colon cancer\par SH: never a smoker, social etoh, \par \par PSA Hx:\par 3.37- 10/2019\par 3.1- 9/2018\par 3.39- 6/2017\par 3.43- 6/2016 [None] : no symptoms

## 2020-02-07 NOTE — ASSESSMENT
[FreeTextEntry1] : PSA levels have been very stable, and wnl for age related range from 9640-9416.  Mild concern regarding firm area left apex of prostate on exam, however not necessarily definitive 'nodule'.\par \par I had long discussion today with patient about the psa, digital exam, and any imaging findings with respect to risks for prostate cancer.  We discussed the utility of prostate MRI as well as some of the new genetic markers in terms of the potential for improving diagnostic accuracy.\par \par We discussed implications as to voiding symptoms, especially with respect to treatment options chosen, and the risk/benefits of prostate biopsy in terms of procedure, dawit-procedure (sepsis, infection, bleeding, retention), and implications/advantages of establishing the diagnosis.\par \par He would like to proceed with: prostate MRI for optimal diagnostic accuracy\par \par Pt also wishes to try saw palmetto for mild BPH symptoms\par \par \par

## 2020-02-08 LAB
APPEARANCE: CLEAR
BACTERIA: NEGATIVE
BILIRUBIN URINE: NEGATIVE
BLOOD URINE: NEGATIVE
COLOR: YELLOW
GLUCOSE QUALITATIVE U: NEGATIVE
HYALINE CASTS: 0 /LPF
KETONES URINE: NEGATIVE
LEUKOCYTE ESTERASE URINE: NEGATIVE
MICROSCOPIC-UA: NORMAL
NITRITE URINE: NEGATIVE
PH URINE: 5.5
PROTEIN URINE: NEGATIVE
RED BLOOD CELLS URINE: 2 /HPF
SPECIFIC GRAVITY URINE: 1.02
SQUAMOUS EPITHELIAL CELLS: 0 /HPF
UROBILINOGEN URINE: NORMAL
WHITE BLOOD CELLS URINE: 1 /HPF

## 2020-03-20 ENCOUNTER — APPOINTMENT (OUTPATIENT)
Dept: MRI IMAGING | Facility: IMAGING CENTER | Age: 55
End: 2020-03-20

## 2021-05-20 ENCOUNTER — NON-APPOINTMENT (OUTPATIENT)
Age: 56
End: 2021-05-20

## 2021-06-03 ENCOUNTER — APPOINTMENT (OUTPATIENT)
Dept: UROLOGY | Facility: CLINIC | Age: 56
End: 2021-06-03
Payer: COMMERCIAL

## 2021-06-03 VITALS
HEART RATE: 68 BPM | TEMPERATURE: 97.2 F | SYSTOLIC BLOOD PRESSURE: 156 MMHG | BODY MASS INDEX: 37.86 KG/M2 | OXYGEN SATURATION: 97 % | WEIGHT: 295 LBS | DIASTOLIC BLOOD PRESSURE: 96 MMHG | HEIGHT: 74 IN

## 2021-06-03 LAB
PSA FREE FLD-MCNC: 19 %
PSA FREE SERPL-MCNC: 0.71 NG/ML
PSA SERPL-MCNC: 3.85 NG/ML

## 2021-06-03 PROCEDURE — 99214 OFFICE O/P EST MOD 30 MIN: CPT

## 2021-06-03 PROCEDURE — 99072 ADDL SUPL MATRL&STAF TM PHE: CPT

## 2021-06-03 NOTE — PHYSICAL EXAM
[General Appearance - Well Developed] : well developed [General Appearance - Well Nourished] : well nourished [Normal Appearance] : normal appearance [Well Groomed] : well groomed [General Appearance - In No Acute Distress] : no acute distress [Abdomen Soft] : soft [Abdomen Tenderness] : non-tender [Costovertebral Angle Tenderness] : no ~M costovertebral angle tenderness [Urethral Meatus] : meatus normal [Penis Abnormality] : normal circumcised penis [Urinary Bladder Findings] : the bladder was normal on palpation [Scrotum] : the scrotum was normal [Testes Mass (___cm)] : there were no testicular masses [Prostate Size ___ (0-4)] : prostate size [unfilled] (scale: 0-4) [FreeTextEntry1] : Nodule area left apex, midline [Edema] : no peripheral edema [] : no respiratory distress [Respiration, Rhythm And Depth] : normal respiratory rhythm and effort [Exaggerated Use Of Accessory Muscles For Inspiration] : no accessory muscle use [Oriented To Time, Place, And Person] : oriented to person, place, and time [Affect] : the affect was normal [Mood] : the mood was normal [Not Anxious] : not anxious [Normal Station and Gait] : the gait and station were normal for the patient's age [No Focal Deficits] : no focal deficits [No Palpable Adenopathy] : no palpable adenopathy

## 2021-06-03 NOTE — LETTER BODY
[Dear  ___] : Dear  [unfilled], [Consult Letter:] : I had the pleasure of evaluating your patient, [unfilled]. [( Thank you for referring [unfilled] for consultation for _____ )] : Thank you for referring [unfilled] for consultation for [unfilled] [Please see my note below.] : Please see my note below. [Consult Closing:] : Thank you very much for allowing me to participate in the care of this patient.  If you have any questions, please do not hesitate to contact me. [Sincerely,] : Sincerely, [FreeTextEntry1] : Pt is now 57 yo male referred for eval regarding psa, prostate exam. No sig urinary c/o; stream is generally good, empties well, nocturia ~ 2/night, no hematuria, no dysuria, no h/o uti or stone.  PSA's brought for review.  Saw Dr. White who had recommended prostate biopsy "because psa was higher".  Was to have MRI. Started saw palmetto with improvement voiding sx.\par \par Delayed f/u secondary to Pandemic.  F/u PSA done last week.\par \par PSA Hx:\par 3.85- free % 19%-- 5/29/21\par 3.37- 10/2019\par 3.1- 9/2018\par 3.39- 6/2017\par 3.43- 6/2016\par \par PSA levels have been very stable, but now increased- 3.85, elevated for age; and found to have hard nodule.\par \par I had long discussion today with patient about the psa, digital exam, and any imaging findings with respect to risks for prostate cancer.  We discussed the utility of prostate MRI as well as some of the new genetic markers in terms of the potential for improving diagnostic accuracy.\par \par We discussed implications as to voiding symptoms, especially with respect to treatment options chosen, and the risk/benefits of prostate biopsy in terms of procedure, dawit-procedure (sepsis, infection, bleeding, retention), and implications/advantages of establishing the diagnosis.\par \par He would like to proceed with: prostate MRI for optimal diagnostic accuracy--> will likely proceed to Prostate bx.\par \par Cont saw palmetto for mild BPH symptoms\par

## 2021-06-03 NOTE — ASSESSMENT
[FreeTextEntry1] : PSA levels have been very stable, but now increased- 3.85, elevated for age; and found to have hard nodule.\par \par I had long discussion today with patient about the psa, digital exam, and any imaging findings with respect to risks for prostate cancer.  We discussed the utility of prostate MRI as well as some of the new genetic markers in terms of the potential for improving diagnostic accuracy.\par \par We discussed implications as to voiding symptoms, especially with respect to treatment options chosen, and the risk/benefits of prostate biopsy in terms of procedure, dawit-procedure (sepsis, infection, bleeding, retention), and implications/advantages of establishing the diagnosis.\par \par He would like to proceed with: prostate MRI for optimal diagnostic accuracy\par \par Cont saw palmetto for mild BPH symptoms\par \par \par  dental

## 2021-06-03 NOTE — HISTORY OF PRESENT ILLNESS
[FreeTextEntry1] : Pt is now 57 yo male referred for eval regarding psa, prostate exam. No sig urinary c/o; stream is generally good, empties well, nocturia ~ 2/night, no hematuria, no dysuria, no h/o uti or stone.  PSA's brought for review.  Saw Dr. White who had recommended prostate biopsy "because psa was higher".  Was to have MRI. Started saw palmetto with improvement voiding sx.\par \par Delayed f/u secondary to Pandemic.  F/u PSA done last week.\par \par PSA Hx:\par 3.85- free % 19%-- 5/29/21\par 3.37- 10/2019\par 3.1- 9/2018\par 3.39- 6/2017\par 3.43- 6/2016 [None] : no symptoms

## 2021-07-20 ENCOUNTER — APPOINTMENT (OUTPATIENT)
Dept: UROLOGY | Facility: CLINIC | Age: 56
End: 2021-07-20
Payer: COMMERCIAL

## 2021-07-20 PROCEDURE — 99214 OFFICE O/P EST MOD 30 MIN: CPT | Mod: 95

## 2021-07-20 NOTE — LETTER BODY
[Dear  ___] : Dear  [unfilled], [Courtesy Letter:] : I had the pleasure of seeing your patient, [unfilled], in my office today. [FreeTextEntry1] : The patient-doctor relationship has been established in a face to face fashion via real time video/audio HIPAA compliant communication using telemedicine software. The patient's identity has been confirmed. The patient was previously emailed a copy of the telemedicine consent. They have had a chance to review and has now given verbal consent and has requested care to be assessed and treated via telemedicine. They understand there may be limitations in this process, and that they may need further followup care in the office and/or hospital settings.\par \par Verbal consent given on Jul 20 2021 10:00AM\par \par BERNADINE WATERS is a 56 year M who presents for elevation above age-specific levels of psa, prostate exam. No sig urinary c/o; stream is generally good, empties well, nocturia ~ several/night, no hematuria, no dysuria, no h/o uti or stone. Started saw palmetto with improvement voiding sx.\par \par Now, s/p MRI for review. There was a nodule left apex, midline\par \par MRI size 83 cc--- psa density 0.04....PIRADS 2. BPH nodules, without evidence for suspicious lesion for clinically sig cancer.\par \par PSA Hx:\par 3.85- free % 19%-- 5/29/21\par 3.37- 10/2019\par 3.1- 9/2018\par 3.39- 6/2017\par 3.43- 6/2016 \par \par 07/20/2021 \par \par The patient denies fevers, chills, nausea and/or vomiting, and no unexplained weight loss.\par \par All pertinent parts of the patient PFSH (past medical, family, and social histories), laboratory, radiological studies and available physician notes were reviewed prior to starting the face-to-face portion of the telemedicine visit. Questionnaire results, where appropriate, were discussed with the patient.\par \par \par PSA relatively stable over years, and MRI low risk of clinically significant disease with very low psa density making sig prostate cancer low risk at this time. Do not recommend biopsy now.\par \par Incidental note of external iliac 9 mm 'borderline prominent' lymph node also reviewed with pt.\par \par Recommend RTC with repeat psa, olamide in approx 4 months (PSA will be 6 months from prior)... will observe, and likely repeat MRI for any sig changes.\par \par Given some increase in nocturia since last appt, but otherwise good emptying and daytime sx, recommend sleep apnea eval given contributions of that to sleep disturbance and o/w isolated nocturia.\par \par Pt in agreement- gave name Dangelo Capone as referral.\par \par RTC 4 months with psa and exam

## 2021-07-20 NOTE — HISTORY OF PRESENT ILLNESS
[Other Location: e.g. School (Enter Location, City,State)___] : at [unfilled], at the time of the visit. [Other Location: e.g. Home (Enter Location, City,State)___] : at [unfilled] [Verbal consent obtained from patient] : the patient, [unfilled] [FreeTextEntry1] : The patient-doctor relationship has been established in a face to face fashion via real time video/audio HIPAA compliant communication using telemedicine software. The patient's identity has been confirmed. The patient was previously emailed a copy of the telemedicine consent. They have had a chance to review and has now given verbal consent and has requested care to be assessed and treated via telemedicine. They understand there may be limitations in this process, and that they may need further followup care in the office and/or hospital settings.\par \par Verbal consent given on Jul 20 2021 10:00AM\par \par BERNADINE WATERS is a 56 year M who presents for elevation above age-specific levels of psa, prostate exam. No sig urinary c/o; stream is generally good, empties well, nocturia ~ several/night, no hematuria, no dysuria, no h/o uti or stone. Started saw palmetto with improvement voiding sx.\par \par Now, s/p MRI for review. There was a nodule left apex, midline\par \par MRI size 83 cc--- psa density 0.04....PIRADS 2. BPH nodules, without evidence for suspicious lesion for clinically sig cancer.\par \par PSA Hx:\par 3.85- free % 19%-- 5/29/21\par 3.37- 10/2019\par 3.1- 9/2018\par 3.39- 6/2017\par 3.43- 6/2016 \par \par \par \par \par 07/20/2021 \par \par The patient denies fevers, chills, nausea and/or vomiting, and no unexplained weight loss.\par \par All pertinent parts of the patient PFSH (past medical, family, and social histories), laboratory, radiological studies and available physician notes were reviewed prior to starting the face-to-face portion of the telemedicine visit. Questionnaire results, where appropriate, were discussed with the patient.\par

## 2021-07-20 NOTE — ASSESSMENT
[FreeTextEntry1] : PSA relatively stable over years, and MRI low risk of clinically significant disease with very low psa density making sig prostate cancer low risk at this time. Do not recommend biopsy now.\par \par Incidental note of external iliac 9 mm 'borderline prominent' lymph node also reviewed with pt.\par \par Recommend RTC with repeat psa, olamide in approx 4 months (PSA will be 6 months from prior)... will observe, and likely repeat MRI for any sig changes.\par \par Given some increase in nocturia since last appt, but otherwise good emptying and daytime sx, recommend sleep apnea eval given contributions of that to sleep disturbance and o/w isolated nocturia.\par \par Pt in agreement- gave name Dangelo Capone as referral.\par \par RTC 4 months with psa and exam

## 2021-07-20 NOTE — PHYSICAL EXAM
[General Appearance - Well Developed] : well developed [General Appearance - Well Nourished] : well nourished [Normal Appearance] : normal appearance [Well Groomed] : well groomed [General Appearance - In No Acute Distress] : no acute distress [FreeTextEntry1] : no jvd [] : no respiratory distress [Respiration, Rhythm And Depth] : normal respiratory rhythm and effort [Exaggerated Use Of Accessory Muscles For Inspiration] : no accessory muscle use [Oriented To Time, Place, And Person] : oriented to person, place, and time [Affect] : the affect was normal [Mood] : the mood was normal [Not Anxious] : not anxious [Normal Station and Gait] : the gait and station were normal for the patient's age [No Focal Deficits] : no focal deficits

## 2021-08-12 DIAGNOSIS — Z00.00 ENCOUNTER FOR GENERAL ADULT MEDICAL EXAMINATION W/OUT ABNORMAL FINDINGS: ICD-10-CM

## 2021-08-17 ENCOUNTER — APPOINTMENT (OUTPATIENT)
Dept: PULMONOLOGY | Facility: CLINIC | Age: 56
End: 2021-08-17

## 2021-09-16 DIAGNOSIS — Z01.818 ENCOUNTER FOR OTHER PREPROCEDURAL EXAMINATION: ICD-10-CM

## 2021-09-17 ENCOUNTER — APPOINTMENT (OUTPATIENT)
Dept: DISASTER EMERGENCY | Facility: CLINIC | Age: 56
End: 2021-09-17

## 2021-09-19 LAB — SARS-COV-2 N GENE NPH QL NAA+PROBE: NOT DETECTED

## 2021-09-20 ENCOUNTER — APPOINTMENT (OUTPATIENT)
Dept: PULMONOLOGY | Facility: CLINIC | Age: 56
End: 2021-09-20

## 2021-09-20 ENCOUNTER — APPOINTMENT (OUTPATIENT)
Dept: PULMONOLOGY | Facility: CLINIC | Age: 56
End: 2021-09-20
Payer: COMMERCIAL

## 2021-09-20 ENCOUNTER — RESULT CHARGE (OUTPATIENT)
Age: 56
End: 2021-09-20

## 2021-09-20 VITALS
SYSTOLIC BLOOD PRESSURE: 162 MMHG | HEIGHT: 74 IN | DIASTOLIC BLOOD PRESSURE: 100 MMHG | OXYGEN SATURATION: 97 % | HEART RATE: 88 BPM | TEMPERATURE: 97.8 F

## 2021-09-20 DIAGNOSIS — R05 COUGH: ICD-10-CM

## 2021-09-20 LAB — POCT - HEMOGLOBIN (HGB), QUANTITATIVE, TRANSCUTANEOUS: 14

## 2021-09-20 PROCEDURE — 88738 HGB QUANT TRANSCUTANEOUS: CPT

## 2021-09-20 PROCEDURE — 94727 GAS DIL/WSHOT DETER LNG VOL: CPT

## 2021-09-20 PROCEDURE — 94729 DIFFUSING CAPACITY: CPT

## 2021-09-20 PROCEDURE — 94010 BREATHING CAPACITY TEST: CPT

## 2021-10-04 ENCOUNTER — APPOINTMENT (OUTPATIENT)
Dept: ORTHOPEDIC SURGERY | Facility: CLINIC | Age: 56
End: 2021-10-04
Payer: OTHER MISCELLANEOUS

## 2021-10-04 DIAGNOSIS — M62.830 MUSCLE SPASM OF BACK: ICD-10-CM

## 2021-10-04 DIAGNOSIS — S93.609A UNSPECIFIED SPRAIN OF UNSPECIFIED FOOT, INITIAL ENCOUNTER: ICD-10-CM

## 2021-10-04 PROCEDURE — 99204 OFFICE O/P NEW MOD 45 MIN: CPT

## 2021-10-04 PROCEDURE — 99072 ADDL SUPL MATRL&STAF TM PHE: CPT

## 2021-10-04 RX ORDER — DICLOFENAC SODIUM 75 MG/1
75 TABLET, DELAYED RELEASE ORAL
Qty: 60 | Refills: 0 | Status: ACTIVE | COMMUNITY
Start: 2021-10-04 | End: 1900-01-01
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
## 2021-10-04 NOTE — PHYSICAL EXAM
[de-identified] : Constitutional: Well-nourished, well-developed, No acute distress, obese\par Respiratory:  Good respiratory effort, no SOB\par Lymphatic: No regional lymphadenopathy, no lymphedema\par Psychiatric: Pleasant and normal affect, alert and oriented x3\par Musculoskeletal: normal except where as noted in regional exam\par \par Thoracic Spine Exam:\par \par normal curvature and normal alignment. good posture. no midline bony tenderness, + marked spasm and associated tenderness of left paraspinal muscles.  ROM mildly limited in sidebending and rotation due to noted spasm\par \par Constitutional: Well-nourished, well-developed, No acute distress\par Respiratory:  Good respiratory effort, no SOB\par Lymphatic: No regional lymphadenopathy, no lymphedema\par Psychiatric: Pleasant and normal affect, alert and oriented x3\par Skin: Clean dry and intact B/L UE/LE\par Musculoskeletal: normal except where as noted in regional exam\par \par Lumbar Spine Exam\par \par APPEARANCE: no marked deformities or malalignment, + loss of lordotic curvature of the lumbosacral spine. + poor posture\par POSITIVE TENDERNESS: + IL/SI/ST ligs, + TTP of b/l SI joints, + left thoracolumbar and lower lumbar tenderness and spasm noted in erector spinae and quadratus lumborum\par NONTENDER: no bony midline tenderness.\par ROM: Moderately limited in all directions, mildly painful at end range of flexion and extension\par RESISTIVE TESTING: painful 4/5 resisted flex/ext, sidebending b/l, and rotation\par SPECIAL TESTS: neg SLR b/l, neg VALDEMAR b/l, neg Trendelenburg b/l \par PULSES: 2+ DP/PT pulses\par NEURO:  L1 - S2 intact to sensation and motor, DTRs 2+/4 patella and achilles\par \par Left foot:\par APPEARANCE: Mild dorsal foot swelling, no ecchymosis, no marked deformities or malalignment\par POSITIVE TENDERNESS: Dorsum of the foot overlying the second third and fourth tarsometatarsal joints\par NONTENDER: 5th metatarsal base, cuboid, 1st MTP, dorsum & plantar surfaces, medial heel, mid heel. \par ROM: normal throughout foot, ankle, and digits. \par RESISTIVE TESTING: painless flex/ext, abd/add of all digits. \par \par

## 2021-10-22 ENCOUNTER — APPOINTMENT (OUTPATIENT)
Dept: ORTHOPEDIC SURGERY | Facility: CLINIC | Age: 56
End: 2021-10-22
Payer: OTHER MISCELLANEOUS

## 2021-10-22 VITALS — BODY MASS INDEX: 35.94 KG/M2 | WEIGHT: 280 LBS | HEIGHT: 74 IN

## 2021-10-22 DIAGNOSIS — M54.2 CERVICALGIA: ICD-10-CM

## 2021-10-22 DIAGNOSIS — M54.50 LOW BACK PAIN, UNSPECIFIED: ICD-10-CM

## 2021-10-22 PROCEDURE — 99214 OFFICE O/P EST MOD 30 MIN: CPT

## 2021-10-22 PROCEDURE — 99072 ADDL SUPL MATRL&STAF TM PHE: CPT

## 2021-10-22 PROCEDURE — 72100 X-RAY EXAM L-S SPINE 2/3 VWS: CPT

## 2021-10-22 PROCEDURE — 72040 X-RAY EXAM NECK SPINE 2-3 VW: CPT

## 2021-10-22 RX ORDER — PREDNISONE 10 MG/1
10 TABLET ORAL
Qty: 30 | Refills: 0 | Status: ACTIVE | COMMUNITY
Start: 2021-10-22 | End: 1900-01-01

## 2021-10-22 NOTE — PHYSICAL EXAM
[de-identified] : Constitutional: Well-nourished, well-developed, No acute distress\par Respiratory:  Good respiratory effort, no SOB\par Lymphatic: No regional lymphadenopathy, no lymphedema\par Psychiatric: Pleasant and normal affect, alert and oriented x3\par Skin: Clean dry and intact B/L UE/LE\par Musculoskeletal: normal except where as noted in regional exam\par \par Cervical Spine Exam\par APPEARANCE: no marked deformities or malalignment, normal curvature, good posture\par POSITIVE TENDERNESS: + Bilateral upper trapezius, levator scapula, rhomboid major, and rhomboid minor, + spasm noted in the same muscles.\par NONTENDER: no bony midline tenderness.\par ROM: limited in all planes, most notably in flexion and sidebending due to pain\par RESISTIVE TESTING: painful 4/5 resisted ext, bilateral sidebending, rotation and shoulder shrug , 5/5 flexion \par SPECIAL TESTS: neg Spurling's b/l\par Vasc: 2+ radial pulse b/l\par Neuro: C5 - T1 intact to motor, DTRs 2+/4 biceps, triceps, brachioradialis\par Sensation: Intact to light touch throughout b/l UE\par \par Thoracic Spine Exam:\par \par normal curvature and normal alignment. good posture. no midline bony tenderness, + marked spasm and associated tenderness of bilateral paraspinal and periscapular muscles.  ROM mildly limited in sidebending and rotation due to noted spasm\par \par Lumbar Spine Exam\par \par APPEARANCE: no marked deformities or malalignment, + loss of lordotic curvature of the lumbosacral spine. + poor posture\par POSITIVE TENDERNESS: + IL/SI/ST ligs, + TTP of b/l SI joints, + left thoracolumbar and lower lumbar tenderness and spasm noted in erector spinae and quadratus lumborum\par NONTENDER: no bony midline tenderness.\par ROM: Moderately limited in all directions, mildly painful at end range of flexion and extension\par RESISTIVE TESTING: painful 4/5 resisted flex/ext, sidebending b/l, and rotation\par SPECIAL TESTS: neg SLR b/l, neg VALDEMAR b/l, neg Trendelenburg b/l \par PULSES: 2+ DP/PT pulses\par NEURO:  L1 - S2 intact to sensation and motor, DTRs 2+/4 patella and achilles\par \par Left foot:\par APPEARANCE: Mild dorsal foot swelling, no ecchymosis, no marked deformities or malalignment\par POSITIVE TENDERNESS: Dorsum of the foot overlying the second third and fourth tarsometatarsal joints\par NONTENDER: 5th metatarsal base, cuboid, 1st MTP, dorsum & plantar surfaces, medial heel, mid heel. \par ROM: normal throughout foot, ankle, and digits. \par RESISTIVE TESTING: painless flex/ext, abd/add of all digits. \par \par  [de-identified] : The following radiographs were ordered and read by me during this patient's visit. I reviewed each radiograph in detail with the patient and discussed the findings as highlighted below. \par \par 2 views of the cervical spine were obtained today that show degenerative changes and evidence of early severe osteoarthritis in the C5-C7 levels.  No fracture, or dislocation seen at this time. There is no malalignment. No other obvious osseous abnormality. Otherwise unremarkable.\par \par 2 views of the lumbar spine were obtained today that show degenerative changes and evidence of severe multilevel osteoarthritis.  No fracture, or dislocation seen at this time. There is malalignment with levoscoliosis, and straightening of normal lordosis. No other obvious osseous abnormality. Otherwise unremarkable.

## 2021-10-22 NOTE — DISCUSSION/SUMMARY
[de-identified] : Patient was seen today for reevaluation of persistent low back pain with intermittent bilateral lower extremity radiculopathy.  Patient has no significant motor weakness or focal sensory deficits, however he is having intermittent radicular pain.  Patient is also evaluated today for new onset of cervical neck pain with bilateral upper extremity radiculopathy, this has been present for the last few weeks and came on gradually after his last evaluation in the office.  Patient has been attending physical therapy regularly without any improvement in his symptoms.  If anything he actually has clinical worsening of his condition since last evaluation.  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  However he was asymptomatic prior to this recent work injury, and now he has symptomatic degenerative disc disease causing significant dysfunction.  Patient has been taking oral NSAIDs regularly without any noted relief, he intermittently will take muscle relaxants in the evening to help with pain at night.  At this time recommend a course of oral prednisone with an appropriate taper (We discussed all possible side effects of this medication), patient advised to not take oral NSAIDs while on prednisone.  Patient may take Tylenol and/or muscle relaxants as needed for breakthrough pain.  With bilateral upper and lower extremity radicular symptoms I recommend we proceed with MRI of the C-spine and L-spine to evaluate for degree of degenerative disc disease and to assess for potential nerve impingement.  If patient is not having improvement with oral prednisone and continued physical therapy, he would likely benefit from physiatry consultation at that time to discuss risk/benefits of epidural steroid injections.  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  Patient will follow-up once MRI results are available.  Patient and spouse appreciate and agree with current plan.\par \par I work as part of an academic orthopedic group and routinely have a physician in training (resident / fellow) working with me.  Any part of the history and physical exam performed by the physician in training was either directly reviewed and/or replicated by myself.  Any procedure performed by the physician in training was performed under my direct supervision and with the consent of the patient.\par \par This note was generated using dragon medical dictation software.  A reasonable effort has been made for proofreading its contents, but typos may still remain.  If there are any questions or points of clarification needed please notify my office.\par

## 2021-10-22 NOTE — HISTORY OF PRESENT ILLNESS
[de-identified] : Patient is here for LBP/Left foot pain follow up. Since last visit patient has had multiple episodes of PT without any major improvement in pain. Has been taking meloxicam as well with mild relief. Now reports for the past 2-3 weeks he has been having neck pain with numbness/tingling down into bilateral hands. Also intermittent reports radicular symptoms down into bilateral legs. Denies any muscle weakness. 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

## 2021-11-15 ENCOUNTER — APPOINTMENT (OUTPATIENT)
Dept: ORTHOPEDIC SURGERY | Facility: CLINIC | Age: 56
End: 2021-11-15

## 2021-12-08 ENCOUNTER — APPOINTMENT (OUTPATIENT)
Dept: ORTHOPEDIC SURGERY | Facility: CLINIC | Age: 56
End: 2021-12-08
Payer: OTHER MISCELLANEOUS

## 2021-12-08 DIAGNOSIS — M54.16 RADICULOPATHY, LUMBAR REGION: ICD-10-CM

## 2021-12-08 DIAGNOSIS — M54.12 RADICULOPATHY, CERVICAL REGION: ICD-10-CM

## 2021-12-08 PROCEDURE — 99213 OFFICE O/P EST LOW 20 MIN: CPT

## 2021-12-08 PROCEDURE — 99072 ADDL SUPL MATRL&STAF TM PHE: CPT

## 2021-12-08 NOTE — DISCUSSION/SUMMARY
[de-identified] : Patient was seen today for reevaluation of cervical radiculopathy and lumbar radiculopathy.  We reviewed his MRI results which shows that he does have significant degenerative findings which are the likely etiology of his persisting pain.  Patient has continued his course of physical therapy, but has not had much significant interval improvement.  At this time recommend physiatry consultation to discuss risk/benefits of RAFA.  Patient has no specific findings that would require surgical consultation as next step in treatment.  Recommend physiatry consultation, but if he does not have improvement may ultimately need orthopedic spine consultation.  Patient appreciates and agrees with current plan.\par \par \par This note was generated using dragon medical dictation software.  A reasonable effort has been made for proofreading its contents, but typos may still remain.  If there are any questions or points of clarification needed please notify my office.

## 2021-12-08 NOTE — PHYSICAL EXAM
[de-identified] : Constitutional: Well-nourished, well-developed, No acute distress\par Respiratory:  Good respiratory effort, no SOB\par Lymphatic: No regional lymphadenopathy, no lymphedema\par Psychiatric: Pleasant and normal affect, alert and oriented x3\par Skin: Clean dry and intact B/L UE/LE\par Musculoskeletal: normal except where as noted in regional exam\par \par Cervical Spine Exam\par APPEARANCE: no marked deformities or malalignment, normal curvature, good posture\par POSITIVE TENDERNESS: + Bilateral upper trapezius, levator scapula, rhomboid major, and rhomboid minor, + spasm noted in the same muscles.\par NONTENDER: no bony midline tenderness.\par ROM: limited in all planes, most notably in flexion and sidebending due to pain\par RESISTIVE TESTING: painful 4/5 resisted ext, bilateral sidebending, rotation and shoulder shrug , 5/5 flexion \par SPECIAL TESTS: neg Spurling's b/l\par Vasc: 2+ radial pulse b/l\par Neuro: C5 - T1 intact to motor, DTRs 2+/4 biceps, triceps, brachioradialis\par Sensation: Intact to light touch throughout b/l UE\par \par Thoracic Spine Exam:\par \par normal curvature and normal alignment. good posture. no midline bony tenderness, + marked spasm and associated tenderness of bilateral paraspinal and periscapular muscles.  ROM mildly limited in sidebending and rotation due to noted spasm\par \par Lumbar Spine Exam\par \par APPEARANCE: no marked deformities or malalignment, + loss of lordotic curvature of the lumbosacral spine. + poor posture\par POSITIVE TENDERNESS: + IL/SI/ST ligs, + TTP of b/l SI joints, + left thoracolumbar and lower lumbar tenderness and spasm noted in erector spinae and quadratus lumborum\par NONTENDER: no bony midline tenderness.\par ROM: Moderately limited in all directions, mildly painful at end range of flexion and extension\par RESISTIVE TESTING: painful 4/5 resisted flex/ext, sidebending b/l, and rotation\par SPECIAL TESTS: neg SLR b/l, neg VALDEMAR b/l, neg Trendelenburg b/l \par PULSES: 2+ DP/PT pulses\par NEURO:  L1 - S2 intact to sensation and motor, DTRs 2+/4 patella and achilles\par  [de-identified] : I reviewed, interpreted and clinically correlated the following outside imaging studies,\par \par Date of Exam: 11-\par  \par EXAM:  MRI CERVICAL SPINE WITHOUT CONTRAST\par \par HISTORY:  Trauma in November 2021 with neck pain.\par \par TECHNIQUE:  Magnetic resonance imaging was performed with sagittal T1-weighted images, sagittal fast spin-echo T2 and STIR images and gradient echo and fast spin-echo T2 axial images.\par \par COMPARISON:  None\par \par FINDINGS: \par \par There is straightening to the cervical lordosis which may be positional or related to muscular spasm.\par \par At C5-6 there is a large left foraminal disc herniation causing severe compression of the exiting left C6 nerve root. There is moderate right foraminal narrowing secondary to uncinate spurring.\par \par At C6-7 there is a broad-based central disc herniation flattening the cord. There is moderate foraminal narrowing secondary to uncinate spurring.\par \par The remaining levels show no disc herniation or spinal stenosis.\par \par There is straightening to the cervical lordosis with disc space narrowing and anterior spondylitic changes C5-6 and C7.\par \par STIR sagittal images show no marrow edema in the vertebral bodies or the posterior elements.\par \par No paraspinal abnormalities are present.\par \par IMPRESSION:  \par \par 1. Straightening to the cervical lordosis.\par \par 2. C5-6 large left foraminal disc herniation severely compressing the exiting left C6 nerve root.\par \par 3. C6-7 broad-based central disc herniation flattening the cord with uncinate spurring and moderate foraminal narrowing.\par \par 	\par EXAM:  MRI LUMBAR SPINE WITHOUT CONTRAST\par \par HISTORY:  Trauma in November 2021 with back pain\par \par TECHNIQUE:  MRI of the lumbar spine was performed utilizing axial T1 and T2, as well as sagittal T1, T2, and proton density weighted pulse sequences, without intravenous contrast material.\par \par COMPARISON:  None\par \par FINDINGS: There is a levoscoliosis.\par \par At L5-S1 there is a central disc herniation indenting the thecal sac.\par \par At L4-5 there is an annular bulge flattening the thecal sac with moderate foraminal narrowing.\par \par At L3-4 there is a mild spinal stenosis secondary to annular bulging with facet degenerative change and ligamentum flavum redundancy. There is lateral recess stenosis left greater than right and proximal foraminal narrowing.\par \par At L2-3 there is a circumferential annular bulge flattening the thecal sac with posterior element degeneration, recess stenosis and proximal foraminal narrowing.\par \par At L1-2 there is an annular bulge eccentric to the right with right recess stenosis and proximal bilateral foraminal narrowing.\par \par There is a Schmorl's node involving the posterior aspect of L1. There is multilevel disc space narrowing with loss of disc signal. There are Modic type II endplate degenerative changes at L2-3 and L3-4.\par \par STIR sagittal images show no marrow edema in the vertebral bodies or the posterior elements.\par \par No paraspinal abnormalities are identified.\par \par IMPRESSION: \par \par 1. L5-S1 central disc herniation indenting the thecal sac.\par \par 2. L4-5 annular bulge flattening the thecal sac with foraminal narrowing.\par \par 3. L3-4 mild spinal stenosis secondary to annular bulging with posterior element degeneration, lateral recess stenosis left greater than right and proximal foraminal narrowing.\par \par 4. L2-3 annular bulge flattening the thecal sac with posterior element degeneration, recess stenosis and proximal foraminal narrowing.\par \par 5. L1-2 annular bulge eccentric to the right with right recess stenosis and proximal foraminal narrowing.
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
## 2021-12-08 NOTE — HISTORY OF PRESENT ILLNESS
[de-identified] : Patient is here for neck/LBP follow up. He has continued to have symptoms. There has been no recent injury. He is here to review his MRI results.

## 2021-12-15 ENCOUNTER — APPOINTMENT (OUTPATIENT)
Dept: UROLOGY | Facility: CLINIC | Age: 56
End: 2021-12-15
Payer: COMMERCIAL

## 2021-12-15 PROCEDURE — 51798 US URINE CAPACITY MEASURE: CPT

## 2021-12-15 PROCEDURE — 99214 OFFICE O/P EST MOD 30 MIN: CPT

## 2021-12-15 NOTE — ASSESSMENT
[FreeTextEntry1] : Bladder scan - 104 ml\par voided\par PVR repeat: ~20 cc\par \par D/w pt at length; presence of nodule, MRI findings, psa and stability.\par \par We spoke about changing from saw palmetto to flomax, +/- finasteride- will cont on saw palmetto for now.\par \par PSA free/total today- will review by phone\par RTC 6 months with u/a, bladder scan, PE

## 2021-12-15 NOTE — HISTORY OF PRESENT ILLNESS
[FreeTextEntry1] : 56 yr old male presents to follow up with elevated PSA, BPH, nodule at left apex on exam. Pt complaining of nocturia x 2-3. Taking saw palmetto\par \par Had MRI for rising psa and left prostate nodule\par \par MRI (6/2021) size 83 cc--- psa density 0.04....PIRADS 2. BPH nodules, without evidence for suspicious lesion for clinically sig cancer.\par \par PSA Hx:\par 3.85- free % 19%-- 5/29/21\par 3.37- 10/2019\par 3.1- 9/2018\par 3.39- 6/2017\par 3.43- 6/2016  [Nocturia] : nocturia

## 2021-12-16 LAB
PSA FREE FLD-MCNC: 22 %
PSA FREE SERPL-MCNC: 0.81 NG/ML
PSA SERPL-MCNC: 3.61 NG/ML

## 2022-06-17 ENCOUNTER — APPOINTMENT (OUTPATIENT)
Dept: UROLOGY | Facility: CLINIC | Age: 57
End: 2022-06-17
Payer: COMMERCIAL

## 2022-06-17 VITALS — DIASTOLIC BLOOD PRESSURE: 85 MMHG | HEART RATE: 69 BPM | SYSTOLIC BLOOD PRESSURE: 154 MMHG

## 2022-06-17 PROCEDURE — 99214 OFFICE O/P EST MOD 30 MIN: CPT

## 2022-06-17 NOTE — ASSESSMENT
[FreeTextEntry1] : D/w pt at length; presence of nodule, MRI findings, psa and stability.\par \par We spoke about changing from saw palmetto to flomax, +/- finasteride- will cont on saw palmetto for now.\par \par PSA free/total today- will review by phone\par RTC 6 months with PE...and additional studies

## 2022-06-17 NOTE — PHYSICAL EXAM
[General Appearance - Well Developed] : well developed [General Appearance - Well Nourished] : well nourished [Normal Appearance] : normal appearance [Well Groomed] : well groomed [General Appearance - In No Acute Distress] : no acute distress [Abdomen Soft] : soft [Abdomen Tenderness] : non-tender [Costovertebral Angle Tenderness] : no ~M costovertebral angle tenderness [Urethral Meatus] : meatus normal [Penis Abnormality] : normal circumcised penis [Urinary Bladder Findings] : the bladder was normal on palpation [Scrotum] : the scrotum was normal [Testes Mass (___cm)] : there were no testicular masses [Prostate Size ___ (0-4)] : prostate size [unfilled] (scale: 0-4) [Edema] : no peripheral edema [] : no respiratory distress [Respiration, Rhythm And Depth] : normal respiratory rhythm and effort [Exaggerated Use Of Accessory Muscles For Inspiration] : no accessory muscle use [Oriented To Time, Place, And Person] : oriented to person, place, and time [Affect] : the affect was normal [Mood] : the mood was normal [Not Anxious] : not anxious [Normal Station and Gait] : the gait and station were normal for the patient's age [No Focal Deficits] : no focal deficits [No Palpable Adenopathy] : no palpable adenopathy [FreeTextEntry1] : firm area left apex

## 2022-06-17 NOTE — LETTER BODY
[Dear  ___] : Dear  [unfilled], [Courtesy Letter:] : I had the pleasure of seeing your patient, [unfilled], in my office today. [Please see my note below.] : Please see my note below. [Consult Closing:] : Thank you very much for allowing me to participate in the care of this patient.  If you have any questions, please do not hesitate to contact me. [Sincerely,] : Sincerely, [FreeTextEntry1] : 57 yr old male presents to follow up with elevated PSA, BPH, nodule at left apex on exam. Pt complaining of nocturia x 2. Taking saw palmetto. Feeling generally well. No new health issues reported. Voiding well. No hematuria, no dysuria.\par \par No new meds at this time. \par \par Had MRI for rising psa and left prostate nodule\par \par MRI (6/2021) size 83 cc--- psa density 0.04....PIRADS 2. BPH nodules, without evidence for suspicious lesion for clinically sig cancer.\par \par PSA Hx:\par 3.61- free 22%- 12/16/21\par 3.85- free % 19%-- 5/29/21\par 3.37- 10/2019\par 3.1- 9/2018\par 3.39- 6/2017\par 3.43- 6/2016 \par \par PE unchanged, firm apex, no discrete.\par \par D/w pt at length; presence of nodule, MRI findings, psa and stability.\par \par We spoke about changing from saw palmetto to flomax, +/- finasteride- will cont on saw palmetto for now.\par \par PSA free/total today- will review by phone\par RTC 6 months with PE...and additional studies

## 2022-06-17 NOTE — HISTORY OF PRESENT ILLNESS
[Nocturia] : nocturia [FreeTextEntry1] : 57 yr old male presents to follow up with elevated PSA, BPH, nodule at left apex on exam. Pt complaining of nocturia x 2. Taking saw palmetto. Feeling generally well. No new health issues reported. Voiding well. No hematuria, no dysuria.\par \par No new meds at this time. \par \par Had MRI for rising psa and left prostate nodule\par \par MRI (6/2021) size 83 cc--- psa density 0.04....PIRADS 2. BPH nodules, without evidence for suspicious lesion for clinically sig cancer.\par \par PSA Hx:\par 3.61- free 22%- 12/16/21\par 3.85- free % 19%-- 5/29/21\par 3.37- 10/2019\par 3.1- 9/2018\par 3.39- 6/2017\par 3.43- 6/2016

## 2022-11-02 NOTE — H&P ADULT - NSHPRISKHEPCSCREEN_GEN_A_CORE
Patients mother calling asking that lab orders be sent over. Patient is at school. Patients mother states that patient has to have lab orders sent over before patient can have lab work done In the morning.    Offered and patient declined

## 2022-11-29 ENCOUNTER — EMERGENCY (EMERGENCY)
Facility: HOSPITAL | Age: 57
LOS: 1 days | Discharge: HOME | End: 2022-11-29
Attending: STUDENT IN AN ORGANIZED HEALTH CARE EDUCATION/TRAINING PROGRAM
Payer: COMMERCIAL

## 2022-11-29 VITALS
TEMPERATURE: 98 F | HEIGHT: 74 IN | RESPIRATION RATE: 18 BRPM | DIASTOLIC BLOOD PRESSURE: 99 MMHG | SYSTOLIC BLOOD PRESSURE: 164 MMHG | OXYGEN SATURATION: 99 % | HEART RATE: 73 BPM | WEIGHT: 279.99 LBS

## 2022-11-29 VITALS
OXYGEN SATURATION: 98 % | RESPIRATION RATE: 18 BRPM | DIASTOLIC BLOOD PRESSURE: 82 MMHG | TEMPERATURE: 98 F | HEART RATE: 63 BPM | SYSTOLIC BLOOD PRESSURE: 154 MMHG

## 2022-11-29 LAB
ALBUMIN SERPL ELPH-MCNC: 4.3 G/DL — SIGNIFICANT CHANGE UP (ref 3.3–5)
ALP SERPL-CCNC: 73 U/L — SIGNIFICANT CHANGE UP (ref 40–120)
ALT FLD-CCNC: 35 U/L — SIGNIFICANT CHANGE UP (ref 10–45)
ANION GAP SERPL CALC-SCNC: 11 MMOL/L — SIGNIFICANT CHANGE UP (ref 5–17)
ANION GAP SERPL CALC-SCNC: 12 MMOL/L — SIGNIFICANT CHANGE UP (ref 5–17)
ANION GAP SERPL CALC-SCNC: 6 MMOL/L — SIGNIFICANT CHANGE UP (ref 5–17)
ANISOCYTOSIS BLD QL: SLIGHT — SIGNIFICANT CHANGE UP
AST SERPL-CCNC: 47 U/L — HIGH (ref 10–40)
BASOPHILS # BLD AUTO: 0 K/UL — SIGNIFICANT CHANGE UP (ref 0–0.2)
BASOPHILS NFR BLD AUTO: 0 % — SIGNIFICANT CHANGE UP (ref 0–2)
BILIRUB SERPL-MCNC: 0.4 MG/DL — SIGNIFICANT CHANGE UP (ref 0.2–1.2)
BUN SERPL-MCNC: 16 MG/DL — SIGNIFICANT CHANGE UP (ref 7–23)
BUN SERPL-MCNC: 16 MG/DL — SIGNIFICANT CHANGE UP (ref 7–23)
BUN SERPL-MCNC: 17 MG/DL — SIGNIFICANT CHANGE UP (ref 7–23)
CALCIUM SERPL-MCNC: 8.9 MG/DL — SIGNIFICANT CHANGE UP (ref 8.4–10.5)
CALCIUM SERPL-MCNC: 9.3 MG/DL — SIGNIFICANT CHANGE UP (ref 8.4–10.5)
CALCIUM SERPL-MCNC: 9.5 MG/DL — SIGNIFICANT CHANGE UP (ref 8.4–10.5)
CHLORIDE SERPL-SCNC: 106 MMOL/L — SIGNIFICANT CHANGE UP (ref 96–108)
CHLORIDE SERPL-SCNC: 106 MMOL/L — SIGNIFICANT CHANGE UP (ref 96–108)
CHLORIDE SERPL-SCNC: 107 MMOL/L — SIGNIFICANT CHANGE UP (ref 96–108)
CO2 SERPL-SCNC: 21 MMOL/L — LOW (ref 22–31)
CO2 SERPL-SCNC: 22 MMOL/L — SIGNIFICANT CHANGE UP (ref 22–31)
CO2 SERPL-SCNC: 22 MMOL/L — SIGNIFICANT CHANGE UP (ref 22–31)
CREAT SERPL-MCNC: 0.96 MG/DL — SIGNIFICANT CHANGE UP (ref 0.5–1.3)
CREAT SERPL-MCNC: 1.03 MG/DL — SIGNIFICANT CHANGE UP (ref 0.5–1.3)
CREAT SERPL-MCNC: 1.04 MG/DL — SIGNIFICANT CHANGE UP (ref 0.5–1.3)
DACRYOCYTES BLD QL SMEAR: SLIGHT — SIGNIFICANT CHANGE UP
EGFR: 83 ML/MIN/1.73M2 — SIGNIFICANT CHANGE UP
EGFR: 84 ML/MIN/1.73M2 — SIGNIFICANT CHANGE UP
EGFR: 92 ML/MIN/1.73M2 — SIGNIFICANT CHANGE UP
EOSINOPHIL # BLD AUTO: 0.07 K/UL — SIGNIFICANT CHANGE UP (ref 0–0.5)
EOSINOPHIL NFR BLD AUTO: 0.9 % — SIGNIFICANT CHANGE UP (ref 0–6)
FLUAV AG NPH QL: SIGNIFICANT CHANGE UP
FLUBV AG NPH QL: SIGNIFICANT CHANGE UP
GLUCOSE SERPL-MCNC: 100 MG/DL — HIGH (ref 70–99)
GLUCOSE SERPL-MCNC: 109 MG/DL — HIGH (ref 70–99)
GLUCOSE SERPL-MCNC: 99 MG/DL — SIGNIFICANT CHANGE UP (ref 70–99)
HCT VFR BLD CALC: 42.8 % — SIGNIFICANT CHANGE UP (ref 39–50)
HGB BLD-MCNC: 13.6 G/DL — SIGNIFICANT CHANGE UP (ref 13–17)
LIDOCAIN IGE QN: 35 U/L — SIGNIFICANT CHANGE UP (ref 7–60)
LYMPHOCYTES # BLD AUTO: 2.09 K/UL — SIGNIFICANT CHANGE UP (ref 1–3.3)
LYMPHOCYTES # BLD AUTO: 27.9 % — SIGNIFICANT CHANGE UP (ref 13–44)
MACROCYTES BLD QL: SLIGHT — SIGNIFICANT CHANGE UP
MAGNESIUM SERPL-MCNC: 2.2 MG/DL — SIGNIFICANT CHANGE UP (ref 1.6–2.6)
MANUAL SMEAR VERIFICATION: SIGNIFICANT CHANGE UP
MCHC RBC-ENTMCNC: 23 PG — LOW (ref 27–34)
MCHC RBC-ENTMCNC: 31.8 GM/DL — LOW (ref 32–36)
MCV RBC AUTO: 72.3 FL — LOW (ref 80–100)
MONOCYTES # BLD AUTO: 0.54 K/UL — SIGNIFICANT CHANGE UP (ref 0–0.9)
MONOCYTES NFR BLD AUTO: 7.2 % — SIGNIFICANT CHANGE UP (ref 2–14)
NEUTROPHILS # BLD AUTO: 4.72 K/UL — SIGNIFICANT CHANGE UP (ref 1.8–7.4)
NEUTROPHILS NFR BLD AUTO: 63.1 % — SIGNIFICANT CHANGE UP (ref 43–77)
OVALOCYTES BLD QL SMEAR: SLIGHT — SIGNIFICANT CHANGE UP
PLAT MORPH BLD: NORMAL — SIGNIFICANT CHANGE UP
PLATELET # BLD AUTO: 236 K/UL — SIGNIFICANT CHANGE UP (ref 150–400)
POIKILOCYTOSIS BLD QL AUTO: SLIGHT — SIGNIFICANT CHANGE UP
POTASSIUM SERPL-MCNC: 4.1 MMOL/L — SIGNIFICANT CHANGE UP (ref 3.5–5.3)
POTASSIUM SERPL-MCNC: 5.8 MMOL/L — HIGH (ref 3.5–5.3)
POTASSIUM SERPL-MCNC: >9 MMOL/L — CRITICAL HIGH (ref 3.5–5.3)
POTASSIUM SERPL-SCNC: 4.1 MMOL/L — SIGNIFICANT CHANGE UP (ref 3.5–5.3)
POTASSIUM SERPL-SCNC: 5.8 MMOL/L — HIGH (ref 3.5–5.3)
POTASSIUM SERPL-SCNC: >9 MMOL/L — CRITICAL HIGH (ref 3.5–5.3)
PROT SERPL-MCNC: 7.5 G/DL — SIGNIFICANT CHANGE UP (ref 6–8.3)
RBC # BLD: 5.92 M/UL — HIGH (ref 4.2–5.8)
RBC # FLD: 16.3 % — HIGH (ref 10.3–14.5)
RBC BLD AUTO: ABNORMAL
RSV RNA NPH QL NAA+NON-PROBE: SIGNIFICANT CHANGE UP
SARS-COV-2 RNA SPEC QL NAA+PROBE: SIGNIFICANT CHANGE UP
SODIUM SERPL-SCNC: 134 MMOL/L — LOW (ref 135–145)
SODIUM SERPL-SCNC: 139 MMOL/L — SIGNIFICANT CHANGE UP (ref 135–145)
SODIUM SERPL-SCNC: 140 MMOL/L — SIGNIFICANT CHANGE UP (ref 135–145)
TARGETS BLD QL SMEAR: SIGNIFICANT CHANGE UP
TROPONIN T, HIGH SENSITIVITY RESULT: 10 NG/L — SIGNIFICANT CHANGE UP (ref 0–51)
TROPONIN T, HIGH SENSITIVITY RESULT: 8 NG/L — SIGNIFICANT CHANGE UP (ref 0–51)
VARIANT LYMPHS # BLD: 0.9 % — SIGNIFICANT CHANGE UP (ref 0–6)
WBC # BLD: 7.48 K/UL — SIGNIFICANT CHANGE UP (ref 3.8–10.5)
WBC # FLD AUTO: 7.48 K/UL — SIGNIFICANT CHANGE UP (ref 3.8–10.5)

## 2022-11-29 PROCEDURE — 80053 COMPREHEN METABOLIC PANEL: CPT

## 2022-11-29 PROCEDURE — 99285 EMERGENCY DEPT VISIT HI MDM: CPT

## 2022-11-29 PROCEDURE — 75574 CT ANGIO HRT W/3D IMAGE: CPT | Mod: MA

## 2022-11-29 PROCEDURE — 80048 BASIC METABOLIC PNL TOTAL CA: CPT

## 2022-11-29 PROCEDURE — 85025 COMPLETE CBC W/AUTO DIFF WBC: CPT

## 2022-11-29 PROCEDURE — 36415 COLL VENOUS BLD VENIPUNCTURE: CPT

## 2022-11-29 PROCEDURE — 75574 CT ANGIO HRT W/3D IMAGE: CPT | Mod: 26,MA

## 2022-11-29 PROCEDURE — 71045 X-RAY EXAM CHEST 1 VIEW: CPT | Mod: 26

## 2022-11-29 PROCEDURE — 84484 ASSAY OF TROPONIN QUANT: CPT

## 2022-11-29 PROCEDURE — 96374 THER/PROPH/DIAG INJ IV PUSH: CPT | Mod: XU

## 2022-11-29 PROCEDURE — 93010 ELECTROCARDIOGRAM REPORT: CPT

## 2022-11-29 PROCEDURE — 93005 ELECTROCARDIOGRAM TRACING: CPT | Mod: XU

## 2022-11-29 PROCEDURE — 93971 EXTREMITY STUDY: CPT

## 2022-11-29 PROCEDURE — 87637 SARSCOV2&INF A&B&RSV AMP PRB: CPT

## 2022-11-29 PROCEDURE — 83690 ASSAY OF LIPASE: CPT

## 2022-11-29 PROCEDURE — 83735 ASSAY OF MAGNESIUM: CPT

## 2022-11-29 PROCEDURE — 93971 EXTREMITY STUDY: CPT | Mod: 26,RT

## 2022-11-29 PROCEDURE — 71045 X-RAY EXAM CHEST 1 VIEW: CPT

## 2022-11-29 PROCEDURE — 99285 EMERGENCY DEPT VISIT HI MDM: CPT | Mod: 25

## 2022-11-29 RX ORDER — FAMOTIDINE 10 MG/ML
20 INJECTION INTRAVENOUS ONCE
Refills: 0 | Status: COMPLETED | OUTPATIENT
Start: 2022-11-29 | End: 2022-11-29

## 2022-11-29 RX ORDER — ACETAMINOPHEN 500 MG
975 TABLET ORAL ONCE
Refills: 0 | Status: COMPLETED | OUTPATIENT
Start: 2022-11-29 | End: 2022-11-29

## 2022-11-29 RX ADMIN — FAMOTIDINE 20 MILLIGRAM(S): 10 INJECTION INTRAVENOUS at 08:08

## 2022-11-29 RX ADMIN — Medication 30 MILLILITER(S): at 08:41

## 2022-11-29 RX ADMIN — Medication 975 MILLIGRAM(S): at 16:46

## 2022-11-29 NOTE — ED PROVIDER NOTE - OBJECTIVE STATEMENT
57M h/o HTN, clear cors (2017) p/w sudden onset chest pain at rest, woke from sleep this am at 0230 & before coming in. Non-exertional. Pt states somewhat improved now but happened twice, both from sleep. Pt does snore according to wife Lisseth at bedside; he's never had a sleep study for LIBRADO. Pt denies dyspnea at this time or during the events themselves. States radiates across the front of his chest, he initialyl thought it was gas.       Historically, pt denies CP on exertion (walking up stairs, walking a block). Follows with Dr. Barros cardiology.

## 2022-11-29 NOTE — ED PROVIDER NOTE - NSFOLLOWUPCLINICSTOKEN_GEN_ALL_ED_FT
[id number]: || ||00\01||False;[id number]: || ||00\01||False;[id number]: || ||00\01||False;[id number]: || ||00\01||False;

## 2022-11-29 NOTE — ED PROVIDER NOTE - PATIENT PORTAL LINK FT
You can access the FollowMyHealth Patient Portal offered by [facility] by registering at the following website: [email or web address]. By joining [employer]’s FollowMyHealth portal, you will also be able to view your health information using other applications (apps) compatible with our system.

## 2022-11-29 NOTE — ED PROVIDER NOTE - PHYSICAL EXAMINATION
GEN: Awake, AOx3, NAD.  HEENT: NCAT  ---EYES: no scleral icterus, EOMI, PERRLA  CARDIO: RRR. Normal S1/S2, no m/r/g.   RESP: CTAB, no w/r/r  ABD: Soft, ND. Mild TTP in epigastrium   MSK: No obvious deformity or ROM deficit. 2+ pulses x4. +Tr edema on RLE > LLE (minimal to none)  SKIN: Warm, dry. No rashes.   NEURO: Moves all four extremities spontaneously  PSYCH: Appropriate mood & affect.

## 2022-11-29 NOTE — ED ADULT NURSE NOTE - OBJECTIVE STATEMENT
Pt presents to the ED c/o chest pain. PMH HTN. As per pt the chest pain woke him up around 2am it was a dull pain across his chest and it happened again at 5am. Pt is OAx4. Breathing unlabored on RA symmetrical rise and fall of the chest. Abdomen is soft and nondistended. Skin is warm and dry to touch. Pt denies any SOB, fever, chills, N/V, urinary symptoms. On stretcher at lowest position. Wife at bedside.

## 2022-11-29 NOTE — ED PROVIDER NOTE - PROGRESS NOTE DETAILS
Shashank Kaufman MD PGY-2  Trop negative, pt symptoms resolved with GI cocktail. D/W Dr. Rodas who is quite c/f CAD given pt risk factors despite these data. Extensive discussions with pt & Dr. Rodas & wife at bedside who ultimately have decided that they are okay with gettnig a CTAngio coronary but do not want to go to cath lab at this time. They express understanding that if CT Coronary is positive, they may have go to Cath regardless. They will call Dr. Rodas & let us know  - CTAngio Coronaries with IV if pt amenable & d/w Dr. Rodas

## 2022-11-29 NOTE — ED PROCEDURE NOTE - ATTENDING CONTRIBUTION TO CARE
I, Georgiana Teran, performed a history and physical exam of the patient and discussed their management with the resident, student, and/or fellow. I reviewed the note and agree with the documented findings and plan of care. I was present and available for all procedures.
I, Georgiana Teran, performed a history and physical exam of the patient and discussed their management with the resident, student, and/or fellow. I reviewed the note and agree with the documented findings and plan of care. I was present and available for all procedures.

## 2022-11-29 NOTE — ED PROVIDER NOTE - NSFOLLOWUPCLINICS_GEN_ALL_ED_FT
Gastroenterology at [facility]  Gastroenterology  [address] [place] [address]  Phone: [phone]  Fax:     [facility]  Gastroenterology  [address] [place] [address]  Phone: [phone]  Fax:     [facility] Gastroenterology  Gastroenterology  [address]  [place] [address]  Phone: [phone]  Fax:     [facility] Pulmonolgy and Sleep Medicine  Pulmonology  [address], [address]  [place] [address]  Phone: [phone]  Fax:

## 2022-11-29 NOTE — ED PROVIDER NOTE - CLINICAL SUMMARY MEDICAL DECISION MAKING FREE TEXT BOX
38M h/o HTN p/w sudden onset chest pain, waking him from sleep x2 this morning. Has elevated BMI, likely LIBRADO as well - though is not definitively diagnosed. Symptoms improved at this time ECG w/o Q waves or CHUNG. PEx with RLE edema, which pt states is because he bumped his leg last week; given unilateral nature of swelling, will assess for DVT. Pt has cardiologist, historical review shows clean coronaries in 2017 by [facility] with Dr. Marie. DDx of atypical CP includes DVT/PE, ACS (though lessl ikely ico normal ECG, will await labs), PTX (though b/l bs/ls WNL), LIBRADO, gastritis/pancreatitis (epigastric TTP, mild though).   - ECG  - Labs, Trop  - RLE US/DVT Study  - CXR  - GI Cocktail, pain control PRN

## 2022-11-29 NOTE — ED PROVIDER NOTE - ATTENDING CONTRIBUTION TO CARE
I, Russ Turcios, performed a history and physical exam of the patient and discussed their management with the resident and /or advanced care provider. I reviewed the resident and /or ACP's note and agree with the documented findings and plan of care. I was present and available for all procedures.    57-year-old male presents with 2 episodes of chest pain seem to be bouncing from left to right.  On exam patient endorsing no active chest pain and states that he did have some red peppers last night and could possibly be indigestion.  Patient's EKG is nonischemic, patient's cardiologist would like him to have a cardiac cath.  Patient states that he would rather be discharged and follow-up in the outpatient setting after multiple conversations patient has ultimately agreed to a CT coronary angiogram.  Patient's labs largely unremarkable with initial troponin of 10, patient given a GI cocktail and during stay in the ED has remained pain-free. Pt also endorsing lower ext pain status post bumping the leg and will obtain DVT. DVT study neg and CXR clear.

## 2022-11-29 NOTE — ED PROVIDER NOTE - NS ED ATTENDING STATEMENT MOD
Luci Farias was seen 09/27/2018 for an audiological evaluation.  Patient complains of severe pain, roaring and  decrease in hearing in the right ear over the past month. Her left ear has a history of no usable hearing.  On 9/2/18 she had a hearing aid dome stuck in her right ear and this was removed by ENT. On 8/21/18, she was treated for right otitis externa and it completely resolved by 9/4/18. On a visit on 9/18/18, Dr. Alvarado appreciates a mass in her right EAC that was not seen at a prior visit.     Results reveal a moderately severe-to-profound mixed hearing loss 250-8000 Hz for the right ear, and  severe sensorineural hearing loss 250-8000 Hz for the left ear.   Speech Reception Thresholds were  70 dBHL for the right ear and 90 dBHL for the left ear.   Word recognition scores were excellent for the right ear and  no response for the left ear.   Tympanograms were Type B, flat for the right ear and Type A, normal for the left ear.    Patient was counseled on the above findings.  She can not wear her hearing aid at this time.    Recommendations include:    1.  ENT followup  2.  Recheck per ENT  3.  Do not wear hearing aid until medically cleared  4.  Wear hearing protective devices around loud noise  5.  Annual audiograms           Attending with

## 2022-11-29 NOTE — ED ADULT NURSE NOTE - NS ED NURSE RECORD ANOTHER HT AND WT
As per Dr Vasques, patient received two doses of valium 10mg. Valium administered to the patient prior to order placement and RX order verification.
Yes

## 2022-11-29 NOTE — ED PROVIDER NOTE - NSFOLLOWUPINSTRUCTIONS_ED_ALL_ED_FT
You were seen in the Emergency Department for chest pain that woke you from sleep. We performed an extensive evaluation in consultation with your Cardiologist (Dr. Rodas), which found mild stenosis of your Left Anterior Descending coronary artery. Fortunately, we do not believe this to be the primary cause of your pain.    You may have some "gastritis" or have sleep apnea. These can be chronically debilitating conditions that should be evaluated in parallel with your cardiac evaluation with Dr. Rodas. Please follow-up with Gastroenterology for consideration of an Endoscopy & Pulmonology for consideration of a sleep study.

## 2023-01-09 NOTE — ED PROVIDER NOTE - NS ED ATTENDING STATEMENT MOD
What Type Of Note Output Would You Prefer (Optional)?: Bullet Format How Severe Is Your Rash?: moderate Is This A New Presentation, Or A Follow-Up?: Rash independent I have personally seen and examined this patient. I have fully participated in the care of this patient. I have reviewed all pertinent clinical information, including history physical exam, plan and the Resident's note and agree except as noted

## 2023-02-22 ENCOUNTER — APPOINTMENT (OUTPATIENT)
Dept: UROLOGY | Facility: CLINIC | Age: 58
End: 2023-02-22
Payer: COMMERCIAL

## 2023-02-22 LAB
BILIRUB UR QL STRIP: NEGATIVE
CLARITY UR: CLEAR
COLLECTION METHOD: NORMAL
GLUCOSE UR-MCNC: NEGATIVE
HCG UR QL: 0.2 EU/DL
HGB UR QL STRIP.AUTO: NEGATIVE
KETONES UR-MCNC: NEGATIVE
LEUKOCYTE ESTERASE UR QL STRIP: NEGATIVE
NITRITE UR QL STRIP: NEGATIVE
PH UR STRIP: 5.5
PROT UR STRIP-MCNC: NEGATIVE
SP GR UR STRIP: 1.03

## 2023-02-22 PROCEDURE — 51798 US URINE CAPACITY MEASURE: CPT

## 2023-02-22 PROCEDURE — 81003 URINALYSIS AUTO W/O SCOPE: CPT | Mod: QW

## 2023-02-22 PROCEDURE — 99214 OFFICE O/P EST MOD 30 MIN: CPT

## 2023-02-22 NOTE — HISTORY OF PRESENT ILLNESS
[Nocturia] : nocturia [FreeTextEntry1] : Now 58 yr old male presents to follow up with elevated PSA, BPH, nodule at left apex on exam. Pt complaining of nocturia x 2- 3. Taking saw palmetto. Feeling generally well. No new health issues reported. Voiding well. No hematuria, no dysuria.\par \par No new meds at this time. \par \par Had MRI for rising psa and left prostate nodule\par \par MRI (6/2021) size 83 cc--- psa density 0.04....PIRADS 2. BPH nodules, without evidence for suspicious lesion for clinically sig cancer.\par \par PSA Hx:\par 3.61- free 22%- 12/16/21\par 3.85- free % 19%-- 5/29/21\par 3.37- 10/2019\par 3.1- 9/2018\par 3.39- 6/2017\par 3.43- 6/2016

## 2023-02-22 NOTE — ASSESSMENT
[FreeTextEntry1] : PVR- 109 ml \par \par UA dip- wnl\par \par D/w pt at length; presence of nodule, MRI findings, psa and stability.\par Recent psa with Dr. Rodas- will call to obtain\par \par I had a long discussion with the patient about his voiding symptoms, bladder emptying, and medical and surgical treatments for BPH.\par \par The mechanism of action of Alpha blockers (including both medications and herbal supplement saw palmetto), 5 alpha reductase inhibitors (such as finasteride/dutasteride), and office and surgical procedures such as TURP, TUVP, laser enucleation, laser ablation, urolift, open/robotic excision, embolization of the prostate, all reviewed as far as risks, benefits, outcomes, and short and long term expectations.\par \par Based on his symptoms, exam, lab values, and procedural findings, I've recommended:\par \par The pt desires to proceed with: flomax and cysto\par \par Will arrange followup as follows-\par \par 1. start flomax\par 2. rtc for cysto

## 2023-02-22 NOTE — PHYSICAL EXAM
[General Appearance - Well Developed] : well developed [General Appearance - Well Nourished] : well nourished [Normal Appearance] : normal appearance [Well Groomed] : well groomed [General Appearance - In No Acute Distress] : no acute distress [Abdomen Soft] : soft [Abdomen Tenderness] : non-tender [Costovertebral Angle Tenderness] : no ~M costovertebral angle tenderness [Urethral Meatus] : meatus normal [Penis Abnormality] : normal circumcised penis [Urinary Bladder Findings] : the bladder was normal on palpation [Scrotum] : the scrotum was normal [Testes Mass (___cm)] : there were no testicular masses [Prostate Size ___ (0-4)] : prostate size [unfilled] (scale: 0-4) [Edema] : no peripheral edema [] : no respiratory distress [Respiration, Rhythm And Depth] : normal respiratory rhythm and effort [Exaggerated Use Of Accessory Muscles For Inspiration] : no accessory muscle use [Oriented To Time, Place, And Person] : oriented to person, place, and time [Affect] : the affect was normal [Mood] : the mood was normal [Not Anxious] : not anxious [Normal Station and Gait] : the gait and station were normal for the patient's age [No Focal Deficits] : no focal deficits [No Palpable Adenopathy] : no palpable adenopathy [Rectal Exam - Rectum] : digital rectal exam was normal [FreeTextEntry1] : firm area left apex

## 2023-06-14 ENCOUNTER — APPOINTMENT (OUTPATIENT)
Dept: UROLOGY | Facility: CLINIC | Age: 58
End: 2023-06-14
Payer: COMMERCIAL

## 2023-06-14 ENCOUNTER — OUTPATIENT (OUTPATIENT)
Dept: OUTPATIENT SERVICES | Facility: HOSPITAL | Age: 58
LOS: 1 days | End: 2023-06-14
Payer: COMMERCIAL

## 2023-06-14 DIAGNOSIS — R35.0 FREQUENCY OF MICTURITION: ICD-10-CM

## 2023-06-14 PROCEDURE — 52000 CYSTOURETHROSCOPY: CPT

## 2023-06-16 DIAGNOSIS — R97.20 ELEVATED PROSTATE SPECIFIC ANTIGEN [PSA]: ICD-10-CM

## 2023-06-16 DIAGNOSIS — N40.0 BENIGN PROSTATIC HYPERPLASIA WITHOUT LOWER URINARY TRACT SYMPTOMS: ICD-10-CM

## 2023-09-26 ENCOUNTER — APPOINTMENT (OUTPATIENT)
Dept: UROLOGY | Facility: CLINIC | Age: 58
End: 2023-09-26
Payer: COMMERCIAL

## 2023-09-26 DIAGNOSIS — N41.1 CHRONIC PROSTATITIS: ICD-10-CM

## 2023-09-26 PROCEDURE — 99442: CPT

## 2023-10-01 LAB — BACTERIA UR CULT: NORMAL

## 2023-12-13 ENCOUNTER — APPOINTMENT (OUTPATIENT)
Dept: UROLOGY | Facility: CLINIC | Age: 58
End: 2023-12-13
Payer: COMMERCIAL

## 2023-12-13 VITALS
TEMPERATURE: 97.4 F | HEART RATE: 67 BPM | BODY MASS INDEX: 36.57 KG/M2 | DIASTOLIC BLOOD PRESSURE: 78 MMHG | HEIGHT: 74 IN | WEIGHT: 285 LBS | RESPIRATION RATE: 17 BRPM | SYSTOLIC BLOOD PRESSURE: 143 MMHG

## 2023-12-13 DIAGNOSIS — N40.2 NODULAR PROSTATE W/OUT LOWER URINARY TRACT SYMPTOMS: ICD-10-CM

## 2023-12-13 PROCEDURE — 51798 US URINE CAPACITY MEASURE: CPT

## 2023-12-13 PROCEDURE — 99214 OFFICE O/P EST MOD 30 MIN: CPT

## 2023-12-13 RX ORDER — SULFAMETHOXAZOLE AND TRIMETHOPRIM 800; 160 MG/1; MG/1
800-160 TABLET ORAL
Qty: 28 | Refills: 0 | Status: COMPLETED | COMMUNITY
Start: 2023-09-26 | End: 2023-12-13

## 2023-12-13 NOTE — HISTORY OF PRESENT ILLNESS
[FreeTextEntry1] : BERNADINE WATERS is a 58 year M who presents for follow up with elevated PSA, BPH, nodule at left apex on exam. Pt complaining of nocturia x 2- 3. Taking flomax since last visits, and also taking saw palmetto 3 times per day, Feeling generally well. No new health issues reported. Voiding well. No hematuria, no dysuria. Has sx of likely subacute prostatitis post cysto- culture was negative, but sx improved after course  bactrim ds.  No new meds at this time.  Had MRI for rising psa and left prostate nodule in past--> MRI (6/2021) size 83 cc--- psa density 0.04....PIRADS 2. BPH nodules, without evidence for suspicious lesion for clinically sig cancer. Had full exam with me in Feb 2023.  PSA Hx: 2.67-- 9/2022 3.61- free 22%- 12/16/21 3.85- free % 19%-- 5/29/21 3.37- 10/2019 3.1- 9/2018 3.39- 6/2017 3.43- 6/2016  [Nocturia] : nocturia

## 2023-12-13 NOTE — ASSESSMENT
[FreeTextEntry1] : Bladder scan today: 69 cc pvr  Symptoms overall stable, back to baseline  we spoke about BPH treatments, including office and OR procedures, 5 alpha reductase inhibitors.  Pt wishes to observe at this time will cont what he's doing check psa free/total today urine culture/UA today RTC 6 months with u/a, bladder scan

## 2023-12-13 NOTE — PHYSICAL EXAM
[Normal Appearance] : normal appearance [Well Groomed] : well groomed [General Appearance - In No Acute Distress] : no acute distress [Edema] : no peripheral edema [Respiration, Rhythm And Depth] : normal respiratory rhythm and effort [Exaggerated Use Of Accessory Muscles For Inspiration] : no accessory muscle use [Abdomen Soft] : soft [Abdomen Tenderness] : non-tender [Costovertebral Angle Tenderness] : no ~M costovertebral angle tenderness [Urinary Bladder Findings] : the bladder was normal on palpation [Normal Station and Gait] : the gait and station were normal for the patient's age [] : no rash [No Focal Deficits] : no focal deficits [Oriented To Time, Place, And Person] : oriented to person, place, and time [Affect] : the affect was normal [Mood] : the mood was normal [No Palpable Adenopathy] : no palpable adenopathy [de-identified] : deferred prostate exam after 2/2023 and discussion of psa f/u

## 2023-12-14 LAB
APPEARANCE: CLEAR
BACTERIA: NEGATIVE /HPF
BILIRUBIN URINE: NEGATIVE
BLOOD URINE: NEGATIVE
CAST: 0 /LPF
COLOR: YELLOW
EPITHELIAL CELLS: 0 /HPF
GLUCOSE QUALITATIVE U: NEGATIVE MG/DL
KETONES URINE: NEGATIVE MG/DL
LEUKOCYTE ESTERASE URINE: NEGATIVE
MICROSCOPIC-UA: NORMAL
NITRITE URINE: NEGATIVE
PH URINE: 5.5
PROTEIN URINE: NEGATIVE MG/DL
PSA FREE FLD-MCNC: 19 %
PSA FREE SERPL-MCNC: 0.65 NG/ML
PSA SERPL-MCNC: 3.37 NG/ML
RED BLOOD CELLS URINE: 1 /HPF
SPECIFIC GRAVITY URINE: 1.02
UROBILINOGEN URINE: 0.2 MG/DL
WHITE BLOOD CELLS URINE: 0 /HPF

## 2023-12-15 LAB — BACTERIA UR CULT: NORMAL

## 2024-01-10 ENCOUNTER — APPOINTMENT (OUTPATIENT)
Dept: PULMONOLOGY | Facility: CLINIC | Age: 59
End: 2024-01-10
Payer: COMMERCIAL

## 2024-01-10 VITALS — SYSTOLIC BLOOD PRESSURE: 158 MMHG | DIASTOLIC BLOOD PRESSURE: 93 MMHG | HEART RATE: 88 BPM | OXYGEN SATURATION: 99 %

## 2024-01-10 DIAGNOSIS — R06.83 SNORING: ICD-10-CM

## 2024-01-10 PROCEDURE — 99203 OFFICE O/P NEW LOW 30 MIN: CPT

## 2024-01-10 NOTE — HISTORY OF PRESENT ILLNESS
[Never] : never [TextBox_4] : 58M with HTN, BPH  reports not sleeping well snoring wakes up frequently to use the bathroom no choking/gasping usually takes 5mg melatonin before bed, doesn't help him sleep notes that when he gets home from work he falls asleep very easily on the couch no headaches in the am easy for him to wake up at 5am no drowsiness during driving never tested for [place]

## 2024-01-10 NOTE — ASSESSMENT
[FreeTextEntry1] : eval for LIBRADO with [facility] he would like to do follow up in [place] as it is closer to his home, gave him  and 's names.

## 2024-01-10 NOTE — CONSULT LETTER
[FreeTextEntry1] : Dear ,  I had the pleasure of evaluating your patient, BERNADINE WATERS today in pulmonary consultation.  Please refer to my attached note for my findings and recommendations.   Thank you for allowing me to participate in the care of your patient, please feel free to call with any questions or concerns.   Sincerely,  Meka Oliver MD [facility]  [facility]

## 2024-01-29 ENCOUNTER — APPOINTMENT (OUTPATIENT)
Dept: PULMONOLOGY | Facility: CLINIC | Age: 59
End: 2024-01-29
Payer: COMMERCIAL

## 2024-01-29 PROCEDURE — 95800 SLP STDY UNATTENDED: CPT

## 2024-01-30 PROCEDURE — 95800 SLP STDY UNATTENDED: CPT

## 2024-02-28 ENCOUNTER — APPOINTMENT (OUTPATIENT)
Dept: PULMONOLOGY | Facility: CLINIC | Age: 59
End: 2024-02-28
Payer: COMMERCIAL

## 2024-02-28 VITALS — DIASTOLIC BLOOD PRESSURE: 79 MMHG | OXYGEN SATURATION: 100 % | HEART RATE: 72 BPM | SYSTOLIC BLOOD PRESSURE: 153 MMHG

## 2024-02-28 PROCEDURE — 99213 OFFICE O/P EST LOW 20 MIN: CPT

## 2024-02-28 NOTE — ASSESSMENT
[FreeTextEntry1] : discussed treatment options oral appliance vs cpap would like to try cpap, will do mask fitting fu after 1 mo of use

## 2024-04-10 ENCOUNTER — APPOINTMENT (OUTPATIENT)
Dept: PULMONOLOGY | Facility: CLINIC | Age: 59
End: 2024-04-10
Payer: COMMERCIAL

## 2024-04-10 VITALS — SYSTOLIC BLOOD PRESSURE: 144 MMHG | OXYGEN SATURATION: 97 % | HEART RATE: 82 BPM | DIASTOLIC BLOOD PRESSURE: 80 MMHG

## 2024-04-10 DIAGNOSIS — G47.33 OBSTRUCTIVE SLEEP APNEA (ADULT) (PEDIATRIC): ICD-10-CM

## 2024-04-10 PROCEDURE — 99213 OFFICE O/P EST LOW 20 MIN: CPT

## 2024-04-17 ENCOUNTER — APPOINTMENT (OUTPATIENT)
Dept: PULMONOLOGY | Facility: CLINIC | Age: 59
End: 2024-04-17

## 2024-05-28 RX ORDER — TAMSULOSIN HYDROCHLORIDE 0.4 MG/1
0.4 CAPSULE ORAL
Qty: 90 | Refills: 3 | Status: ACTIVE | COMMUNITY
Start: 2023-02-22 | End: 1900-01-01

## 2024-06-19 ENCOUNTER — APPOINTMENT (OUTPATIENT)
Dept: UROLOGY | Facility: CLINIC | Age: 59
End: 2024-06-19
Payer: COMMERCIAL

## 2024-06-19 VITALS
HEIGHT: 74 IN | HEART RATE: 78 BPM | BODY MASS INDEX: 36.57 KG/M2 | WEIGHT: 285 LBS | TEMPERATURE: 98.3 F | DIASTOLIC BLOOD PRESSURE: 83 MMHG | SYSTOLIC BLOOD PRESSURE: 127 MMHG

## 2024-06-19 DIAGNOSIS — Z12.5 ENCOUNTER FOR SCREENING FOR MALIGNANT NEOPLASM OF PROSTATE: ICD-10-CM

## 2024-06-19 DIAGNOSIS — N40.0 BENIGN PROSTATIC HYPERPLASIA WITHOUT LOWER URINARY TRACT SYMPMS: ICD-10-CM

## 2024-06-19 DIAGNOSIS — R97.20 ELEVATED PROSTATE, SPECIFIC ANTIGEN [PSA]: ICD-10-CM

## 2024-06-19 LAB
BILIRUB UR QL STRIP: NORMAL
CLARITY UR: CLEAR
COLLECTION METHOD: NORMAL
GLUCOSE UR-MCNC: NORMAL
HCG UR QL: 0.2 EU/DL
HGB UR QL STRIP.AUTO: NORMAL
KETONES UR-MCNC: NORMAL
LEUKOCYTE ESTERASE UR QL STRIP: NORMAL
NITRITE UR QL STRIP: NORMAL
PH UR STRIP: 6
PROT UR STRIP-MCNC: NORMAL
SP GR UR STRIP: 1.02

## 2024-06-19 PROCEDURE — 81003 URINALYSIS AUTO W/O SCOPE: CPT | Mod: QW

## 2024-06-19 PROCEDURE — 99214 OFFICE O/P EST MOD 30 MIN: CPT

## 2024-06-19 PROCEDURE — 51798 US URINE CAPACITY MEASURE: CPT

## 2024-06-19 NOTE — ASSESSMENT
[FreeTextEntry1] : Bladder scan today: 55 cc pvr (was 69 cc 12/2023, on treatment)  Symptoms overall stable  we spoke about BPH treatments, including office and OR procedures, 5 alpha reductase inhibitors.  Pt wishes to observe at this time will cont what he's doing check psa free/total next in approx 6 months RTC 6 months with u/a, bladder scan

## 2024-06-19 NOTE — HISTORY OF PRESENT ILLNESS
[Nocturia] : nocturia [FreeTextEntry1] : BERNADINE WATERS is a now- 59 year M who presents for follow up with elevated PSA, BPH, nodule at left apex on exam. Pt complaining of nocturia x 1-2, overall somewhat improved. Taking flomax since last visits, and also taking saw palmetto 3 times per day, Feeling generally well. No new health issues reported. Voiding well. No hematuria, no dysuria. Had sx of likely subacute prostatitis post cysto- culture was negative, but sx improved after course  bactrim ds. Now on stable  No new meds at this time.  Had MRI for rising psa and left prostate nodule in past--> MRI (6/2021) size 83 cc--- psa density 0.04....PIRADS 2. BPH nodules, without evidence for suspicious lesion for clinically sig cancer. Had full exam with me in Feb 2023.  PSA Hx: 3.37-- 12/2023 (Free 19%) 2.67-- 9/2022 3.61- free 22%- 12/16/21 3.85- free % 19%-- 5/29/21 3.37- 10/2019 3.1- 9/2018 3.39- 6/2017 3.43- 6/2016

## 2024-06-19 NOTE — PHYSICAL EXAM
[Normal Appearance] : normal appearance [Well Groomed] : well groomed [General Appearance - In No Acute Distress] : no acute distress [Edema] : no peripheral edema [Respiration, Rhythm And Depth] : normal respiratory rhythm and effort [Exaggerated Use Of Accessory Muscles For Inspiration] : no accessory muscle use [Abdomen Soft] : soft [Abdomen Tenderness] : non-tender [Costovertebral Angle Tenderness] : no ~M costovertebral angle tenderness [Urinary Bladder Findings] : the bladder was normal on palpation [Normal Station and Gait] : the gait and station were normal for the patient's age [] : no rash [No Focal Deficits] : no focal deficits [Oriented To Time, Place, And Person] : oriented to person, place, and time [Affect] : the affect was normal [Mood] : the mood was normal [No Palpable Adenopathy] : no palpable adenopathy [de-identified] : deferred prostate exam after 2/2023 and discussion of psa f/u

## 2024-11-27 ENCOUNTER — APPOINTMENT (OUTPATIENT)
Dept: UROLOGY | Facility: CLINIC | Age: 59
End: 2024-11-27
Payer: COMMERCIAL

## 2024-11-27 ENCOUNTER — RESULT CHARGE (OUTPATIENT)
Age: 59
End: 2024-11-27

## 2024-11-27 VITALS — DIASTOLIC BLOOD PRESSURE: 80 MMHG | HEART RATE: 80 BPM | SYSTOLIC BLOOD PRESSURE: 128 MMHG

## 2024-11-27 DIAGNOSIS — Z12.5 ENCOUNTER FOR SCREENING FOR MALIGNANT NEOPLASM OF PROSTATE: ICD-10-CM

## 2024-11-27 DIAGNOSIS — R97.20 ELEVATED PROSTATE, SPECIFIC ANTIGEN [PSA]: ICD-10-CM

## 2024-11-27 DIAGNOSIS — N40.0 BENIGN PROSTATIC HYPERPLASIA WITHOUT LOWER URINARY TRACT SYMPMS: ICD-10-CM

## 2024-11-27 LAB
BILIRUB UR QL STRIP: NORMAL
CLARITY UR: CLEAR
COLLECTION METHOD: NORMAL
GLUCOSE UR-MCNC: NORMAL
HCG UR QL: 0.2 EU/DL
HGB UR QL STRIP.AUTO: NORMAL
KETONES UR-MCNC: NORMAL
LEUKOCYTE ESTERASE UR QL STRIP: NORMAL
NITRITE UR QL STRIP: NORMAL
PH UR STRIP: 5.5
PROT UR STRIP-MCNC: NORMAL
SP GR UR STRIP: 1.03

## 2024-11-27 PROCEDURE — 51798 US URINE CAPACITY MEASURE: CPT

## 2024-11-27 PROCEDURE — 99214 OFFICE O/P EST MOD 30 MIN: CPT

## 2024-11-27 RX ORDER — TAMSULOSIN HYDROCHLORIDE 0.4 MG/1
0.4 CAPSULE ORAL
Qty: 3 | Refills: 1 | Status: ACTIVE | OUTPATIENT
Start: 2024-11-27

## 2024-12-02 LAB — PSA SERPL-MCNC: 4.35 NG/ML

## 2024-12-16 ENCOUNTER — NON-APPOINTMENT (OUTPATIENT)
Age: 59
End: 2024-12-16

## 2024-12-30 ENCOUNTER — APPOINTMENT (OUTPATIENT)
Dept: UROLOGY | Facility: CLINIC | Age: 59
End: 2024-12-30
Payer: COMMERCIAL

## 2024-12-30 DIAGNOSIS — R97.20 ELEVATED PROSTATE, SPECIFIC ANTIGEN [PSA]: ICD-10-CM

## 2024-12-30 DIAGNOSIS — N40.0 BENIGN PROSTATIC HYPERPLASIA WITHOUT LOWER URINARY TRACT SYMPMS: ICD-10-CM

## 2024-12-30 PROCEDURE — 99441: CPT

## 2025-05-28 ENCOUNTER — NON-APPOINTMENT (OUTPATIENT)
Age: 60
End: 2025-05-28

## 2025-05-28 ENCOUNTER — APPOINTMENT (OUTPATIENT)
Dept: UROLOGY | Facility: CLINIC | Age: 60
End: 2025-05-28
Payer: COMMERCIAL

## 2025-05-28 VITALS — SYSTOLIC BLOOD PRESSURE: 149 MMHG | DIASTOLIC BLOOD PRESSURE: 87 MMHG

## 2025-05-28 DIAGNOSIS — R97.20 ELEVATED PROSTATE, SPECIFIC ANTIGEN [PSA]: ICD-10-CM

## 2025-05-28 DIAGNOSIS — N40.0 BENIGN PROSTATIC HYPERPLASIA WITHOUT LOWER URINARY TRACT SYMPMS: ICD-10-CM

## 2025-05-28 DIAGNOSIS — Z12.5 ENCOUNTER FOR SCREENING FOR MALIGNANT NEOPLASM OF PROSTATE: ICD-10-CM

## 2025-05-28 PROCEDURE — 99214 OFFICE O/P EST MOD 30 MIN: CPT

## 2025-05-31 LAB
APPEARANCE: CLEAR
BACTERIA UR CULT: NORMAL
BACTERIA: NEGATIVE /HPF
BILIRUBIN URINE: NEGATIVE
BLOOD URINE: NEGATIVE
CAST: 0 /LPF
COLOR: YELLOW
EPITHELIAL CELLS: 0 /HPF
GLUCOSE QUALITATIVE U: NEGATIVE MG/DL
KETONES URINE: NEGATIVE MG/DL
LEUKOCYTE ESTERASE URINE: NEGATIVE
MICROSCOPIC-UA: NORMAL
NITRITE URINE: NEGATIVE
PH URINE: 5.5
PROTEIN URINE: NEGATIVE MG/DL
PSA FREE FLD-MCNC: 23 %
PSA FREE SERPL-MCNC: 0.7 NG/ML
PSA SERPL-MCNC: 2.99 NG/ML
RED BLOOD CELLS URINE: 1 /HPF
SPECIFIC GRAVITY URINE: 1.02
UROBILINOGEN URINE: 0.2 MG/DL
WHITE BLOOD CELLS URINE: 0 /HPF